# Patient Record
Sex: FEMALE | Race: WHITE | NOT HISPANIC OR LATINO | Employment: FULL TIME | URBAN - METROPOLITAN AREA
[De-identification: names, ages, dates, MRNs, and addresses within clinical notes are randomized per-mention and may not be internally consistent; named-entity substitution may affect disease eponyms.]

---

## 2017-01-06 ENCOUNTER — GENERIC CONVERSION - ENCOUNTER (OUTPATIENT)
Dept: OTHER | Facility: OTHER | Age: 37
End: 2017-01-06

## 2017-02-02 PROCEDURE — G0145 SCR C/V CYTO,THINLAYER,RESCR: HCPCS | Performed by: OBSTETRICS & GYNECOLOGY

## 2017-02-03 ENCOUNTER — LAB REQUISITION (OUTPATIENT)
Dept: LAB | Facility: HOSPITAL | Age: 37
End: 2017-02-03
Payer: COMMERCIAL

## 2017-02-03 DIAGNOSIS — Z11.51 ENCOUNTER FOR SCREENING FOR HUMAN PAPILLOMAVIRUS (HPV): ICD-10-CM

## 2017-02-03 DIAGNOSIS — Z01.419 ENCOUNTER FOR GYNECOLOGICAL EXAMINATION WITHOUT ABNORMAL FINDING: ICD-10-CM

## 2017-02-08 LAB
LAB AP GYN PRIMARY INTERPRETATION: NORMAL
LAB AP LMP: NORMAL
Lab: NORMAL

## 2017-05-12 ENCOUNTER — GENERIC CONVERSION - ENCOUNTER (OUTPATIENT)
Dept: OTHER | Facility: OTHER | Age: 37
End: 2017-05-12

## 2017-05-24 ENCOUNTER — ALLSCRIPTS OFFICE VISIT (OUTPATIENT)
Dept: OTHER | Facility: OTHER | Age: 37
End: 2017-05-24

## 2017-06-05 ENCOUNTER — GENERIC CONVERSION - ENCOUNTER (OUTPATIENT)
Dept: OTHER | Facility: OTHER | Age: 37
End: 2017-06-05

## 2017-09-18 ENCOUNTER — ALLSCRIPTS OFFICE VISIT (OUTPATIENT)
Dept: OTHER | Facility: OTHER | Age: 37
End: 2017-09-18

## 2017-12-06 ENCOUNTER — GENERIC CONVERSION - ENCOUNTER (OUTPATIENT)
Dept: FAMILY MEDICINE CLINIC | Facility: CLINIC | Age: 37
End: 2017-12-06

## 2017-12-13 ENCOUNTER — ALLSCRIPTS OFFICE VISIT (OUTPATIENT)
Dept: OTHER | Facility: OTHER | Age: 37
End: 2017-12-13

## 2017-12-14 NOTE — PROGRESS NOTES
Assessment    1  Encounter to establish care with new doctor (V65 8) (Z76 89)   2  Weight loss (783 21) (R63 4)   3  CAP (community acquired pneumonia) (5) (J18 9)   4  Controlled diabetes mellitus type 1 without complications (955 17) (M55 9)    Plan  Controlled diabetes mellitus type 1 without complications    · From  Levemir FlexTouch 100 UNIT/ML Subcutaneous Solution Pen-[id number] units sq at 9:00pm To Levemir FlexTouch 100 UNIT/ML Subcutaneous SolutionPen-injector USE AS DIRECTED    Discussion/Summary  Discussion Summary:   37F new patient here to establish care:Health Maintenance- UTD with metabolic screening that she gets done with Endo, will request recordssmear UTD as of this year, will need repeat in 2020 and do HPV at that timeType 1 DM- f/w [facility], will request records, recent A1c was 8 0%, UTD on optometrist and foot exams per ptLevemir 31 u qam and 36u qpm with Novolog SSI managed by Endo  Weight loss- pt has had a 7 pound weight loss in the past 3 months, extensive ROS revealed no acute symptoms to indicate a source and reportedly has had normal labs recently  Pt states that her pre-pregnancy weight was around 125, documented lowest prior to pregnancy in 2015 was 119  Was 138 in Sept 2017, today is 131  Likely residual pregnancy weight, but pt is anxiousto monitorlabwork if Endo labs were abnormalto review weight in 6 months, sooner PRN  PNA- recently diagnosed with PNA at urgent care and treated with Z-adriano, lungs clear today, no need for repeat CXRincentive breathing  Counseling Documentation With Imm: The patient was counseled regarding instructions for management,-- patient and family education  Medication SE Review and Pt Understands Tx: Possible side effects of new medications were reviewed with the patient/guardian today  The treatment plan was reviewed with the patient/guardian   The patient/guardian understands and agrees with the treatment plan      Chief Complaint  Chief Complaint Free Text Note Form: new pt      History of Present Illness  HPI: The patient is a new patient to the practice  PCP: Dr Arias James like the care at her last visit and has been looking to switch because her [facility] Dr Maggy Kingus 1 DMBTL - Nov 2015, [place] tooth removalHx: 2 C-sections at 39 weeks, one 3 5 and the other 3year oldLevemir 31 u qam, 36u qpmNKDAHx: No h/o DMII, cousins- Crohns, Mother- meningiomaHx:technician with [employer], cigarettes, illicit drugand sexually active  Pap was in Feb 2017- not due until 2020  complaints today:seen at [facility] less than a week ago got a Z-adriano and inhaler and had CXR that diagnosed PNA and required antibioticslabs recently with Endo: Last A1c was 8 0%infrequent HA with ear fullness and worried about brain tumor with the family history      Review of Systems  Complete-Female:  Constitutional: recent 7 pounds in the past 3 months lb weight loss, but-- no fever-- and-- no chills  ENT: no sore throat-- and-- no nasal discharge  Cardiovascular: no chest pain-- and-- no palpitations  Respiratory: no shortness of breath-- and-- no cough  Gastrointestinal: no nausea,-- no vomiting-- and-- no diarrhea  Active Problems    1  Controlled diabetes mellitus type 1 without complications (672 17) (P84 7)   2  Headache, unspecified headache type (784 0) (R51)   3  Neck pain on right side (723 1) (M54 2)   4  History of Need for vaccination for DTP (V06 1) (Z23)   5  Pressure in head (784 0) (R51)   6  Right ear pain (388 70) (H92 01)   7  Uterine Neoplasm (Obstetric) - Antepartum Condition Or Complication (905 78)    Past Medical History  1  History of Encounter for nuchal translucency testing (V28 89) (Z36 82)   2  History of anorexia nervosa (V11 8) (Z86 59)   3  History of pregnancy (V13 29)   4  History of Need for vaccination for DTP (V06 1) (Z23)   5  History of Pain In Flank (789 09)   6   History of Submucous leiomyoma of uterus (218 0) (D25 0)  Active Problems And Past Medical History Reviewed: The active problems and past medical history were reviewed and updated today  Surgical History  1  History of  Section   2  Denied: History Of Prior Surgery   3  History of Surgically Induced   Surgical History Reviewed: The surgical history was reviewed and updated today  Family History  Mother    1  Family history of Acute Hepatitis, B Virus   2  Family history of Meningioma  Father    3  Family history of Hypertension (V17 49)  Maternal Grandmother    4  Family history of Breast Cancer (V16 3)  Family History Reviewed: The family history was reviewed and updated today  Social History     · Being A Social Drinker   · History of Current Every Day Smoker (305 1)   · Denied: History of Drug Use   ·    · Never a smoker  Social History Reviewed: The social history was reviewed and updated today  Current Meds   1  Levemir FlexTouch 100 UNIT/ML Subcutaneous Solution Pen-injector; 10 units sq at 9:00pm; Therapy: 40PCM5551 to (Last Rx:87Ujp8192)  Requested for: 12UED7393 Ordered   2  NovoLOG 100 UNIT/ML Subcutaneous Solution; Therapy: (Recorded:49Xng6212) to Recorded    Allergies  1  No Known Drug Allergies  2  No Known Environmental Allergies   3  No Known Food Allergies    Vitals  Vital Signs    Recorded: 96Rvo8510 10:04AM   Temperature 96 8 F   Heart Rate 72   Respiration 16   Systolic 671   Diastolic 70   Height 5 ft 4 in   Weight 131 lb    BMI Calculated 22 49   BSA Calculated 1 63       Physical Exam   Constitutional  General appearance: No acute distress, well appearing and well nourished  Eyes  Conjunctiva and lids: No swelling, erythema or discharge  Pulmonary  Respiratory effort: No increased work of breathing or signs of respiratory distress  Auscultation of lungs: Clear to auscultation     Cardiovascular  Auscultation of heart: Normal rate and rhythm, normal S1 and S2, without murmurs  Skin  Skin and subcutaneous tissue: Normal without rashes or lesions  -- multiple tattoos on upper extremities          Signatures   Electronically signed by : Elfreda Duverney, M D ; Dec 13 2017 10:40AM EST                       (Author)

## 2018-01-11 NOTE — MISCELLANEOUS
Message  Return to work or school:        Our records indicate that you are overdue for a foot evaluation  Please contact your foot doctor to schedule an appointment  If you have any questions please contact our office at [phone]  Thank you     Jason Williamson Do/am       Signatures   Electronically signed by : Emily Henriquez, ; Sep  2 2016 12:03PM EST                       (Author)    Electronically signed by : Emily Henriquez, ; Sep  9 2016  8:45AM EST                       (Author)

## 2018-01-13 NOTE — PROGRESS NOTES
Assessment    1  Encounter for preventive health examination (V70 0) (Z00 00)   2  Headache, unspecified headache type (784 0) (R51)    Discussion/Summary  health maintenance visit      Chief Complaint  Yearly PE, has been getting headaches      History of Present Illness  HM, Adult Female: The patient is being seen for a health maintenance evaluation  General Health:   Screening:   Headache (Brief): The patient is being seen for an initial evaluation of and since April headaches  This condition is not related to a specific injury  Symptoms:  photophobia, but no nausea and no vomiting    The patient presents with complaints of gradual onset of intermittent episodes of mild alternating sides, bilateral frontal, bilateral temporal and bilateral occipital headache, described as dull  Episodes started about 5 months ago  She is currently experiencing headache  Her symptoms are caused by no known event  Symptoms are improved by NSAIDs  Symptoms are unchanged  The patient is currently experiencing symptoms  Pain scores include a minimum pain level of 2/10 and a maximum pain level of 6/10  Functional Limitations: general activity  Current treatment includes nonsteroidal anti-inflammatory drugs  By report there is fair symptom control  Active Problems    1  Acute sinusitis (461 9) (J01 90)   2  Cervical cancer screening (V76 2) (Z12 4)   3  Controlled diabetes mellitus type 1 without complications (416 21) (R40 2)   4  Encounter for fetal anatomic survey (V28 81) (Z36)   5  Need for influenza vaccination (V04 81) (Z23)   6  Need for vaccination for DTP (V06 1) (Z23)   7  Right ear pain (388 70) (H92 01)   8  Screening for depression (V79 0) (Z13 89)   9  Screening, , for risk of pre-term labor (V28 82) (Z36)   10  URTI (acute upper respiratory infection) (465 9) (J06 9)   11   Uterine Neoplasm (Obstetric) - Antepartum Condition Or Complication (062 19)    Past Medical History    · History of Encounter for nuchal translucency testing (V28 89) (Z36)   · History of anorexia nervosa (V11 8) (Z86 59)   · History of pregnancy (V13 29)   · Need for vaccination for DTP (V06 1) (Z23)   · History of Pain In Flank (789 09)   · History of Submucous leiomyoma of uterus (218 0) (D25 0)    Surgical History    · History of  Section   · Denied: History Of Prior Surgery   · History of Surgically Induced     Family History  Mother    · Family history of Acute Hepatitis, B Virus   · Family history of Meningioma  Father    · Family history of Hypertension (V17 49)  Maternal Grandmother    · Family history of Breast Cancer (V16 3)    Social History    · Being A Social Drinker   · History of Current Every Day Smoker (305 1)   · Denied: History of Drug Use   ·    · Never a smoker    Current Meds   1  Levemir FlexTouch 100 UNIT/ML Subcutaneous Solution Pen-injector; 10 units sq at   9:00pm;   Therapy: 33NCI2437 to (Last Rx:46Ktb5421)  Requested for: 44TCE6610 Ordered   2  NovoLOG 100 UNIT/ML Subcutaneous Solution; Therapy: (Recorded:2017) to Recorded    Allergies    1  No Known Drug Allergies    2  No Known Environmental Allergies   3  No Known Food Allergies    Vitals   Recorded: 2017 01:34PM Recorded: 89FMZ7949 35:61KC   Systolic 515    Diastolic 78    Height  5 ft 4 in   Weight  138 lb 2 oz   BMI Calculated  23 71   BSA Calculated  1 67     Results/Data  PHQ-2 Adult Depression Screening 2017 01:07PM User, Ahs     Test Name Result Flag Reference   PHQ-2 Adult Depression Score 0     Over the last two weeks, how often have you been bothered by any of the following problems? Little interest or pleasure in doing things: Not at all - 0  Feeling down, depressed, or hopeless: Not at all - 0   PHQ-2 Adult Depression Screening Negative         Health Management  Cervical cancer screening   (1) THIN PREP PAP WITH IMAGING; every 3 years; Last ; Next Due:  2020;  Active  Diabetes mellitus type 2, uncontrolled, without complications   (1) HEMOGLOBIN A1C; every 6 months; Last 02JSC1839; Next Due: 72AUI7399; Overdue  (1) MICROALBUMIN CREATININE RATIO, RANDOM URINE; every 1 year; Next Due:  17Tle6928; Overdue  *VB - Foot Exam; every 1 year; Last 19Fub6132; Next Due: 96Hln1079; Overdue  Screening for diabetic retinopathy   *VB - Eye Exam; every 1 year; Last 35BZO5723; Next Due: 48VUO3534;  Active    Signatures   Electronically signed by : Tiffanie Yates DO; Sep 19 5914 12:07PM EST                       (Author)

## 2018-01-13 NOTE — PROGRESS NOTES
Assessment    1  Controlled diabetes mellitus type 1 without complications (617 61) (V89 3)   2  Encounter for preventive health examination (V70 0) (Z00 00)    Plan  Controlled diabetes mellitus type 1 without complications    · From  NovoLOG SOLN  To NovoLOG 100 UNIT/ML Subcutaneous Solution  USE AS DIRECTED   · 3 - Yuniel Toledo DO  (Internal Medicine) Physician Referral  Consult  Status: Hold For -  Scheduling  Requested for: 12Sep2016  : has been seen ther for > 2 years  Labs are forwarded to us  I do NOT do primary      DM management  are Referring to a non-SL Preferred Provider : Established Patient  Care Summary provided  : Yes   · *VB - Foot Exam; Status:Complete;   Done: 14JLS9883 11:17AM  Diabetes mellitus type 2, uncontrolled, without complications    · (1) HEMOGLOBIN A1C ; every 6 months; Last 08OWZ9155; Next 75KSQ6663;  Status:Active   · *VB - Foot Exam ; every 1 year;  Last 30Ate3018; Next 01Baa0995; [address] [place] Maintenance    · Begin a limited exercise program ; Status:Complete;   Done: 02ZOI6723   · Brush your teeth 3 times a day and floss at least once a day ; Status:Complete;   Done:  65EAI9302   · Decreasing the stress in your life may help your condition improve ; Status:Complete;    Done: 15MMJ9484   · Limit your use of alcohol to 2 drinks or cans of beer a day ; Status:Complete;   Done:  64BJT3790   · Use a sun block product with an SPF of 15 or more ; Status:Complete;   Done:  20PKY7812   · We recommend that you make the following changes in your diet to help prevent or  reduce symptoms of PMS ; Status:Complete;   Done: 41BGA4273   · We want you to follow the Therapeutic Lifestyle Changes (TLC) diet ; Status:Complete;    Done: 81DSC2516   · Follow Up in 2 Years Evaluation and Treatment  Follow-up  Status: Complete  Done:  61QAX0487  Screening for depression    · *VB-Depression Screening; Status:Complete - Retrospective By Protocol Authorization;    Done: 87BDK6661 10: 29AM    Discussion/Summary  health maintenance visit Currently, she eats a healthy diet and has an adequate exercise regimen  cervical cancer screening is current Breast cancer screening: breast cancer screening is not indicated  Colorectal cancer screening: colorectal cancer screening is not indicated  Osteoporosis screening: bone mineral density testing is not indicated  The patient declines immunizations  Advice and education were given regarding nutrition, aerobic exercise, calcium supplements, vitamin D supplements, reproductive health, sunscreen use and self skin examination  Patient discussion: discussed with the patient, 20 minute visit, greater than half of the time was spent on counseling  Chief Complaint  Pt is here for a yearly physical      History of Present Illness  HM, Adult Female: The patient is being seen for a health maintenance evaluation  Social History: Household members include spouse, 1 daughter(s) and 1 son(s)  She is   Work status: on maternity leave  The patient has never smoked cigarettes  She reports rare alcohol use  General Health: The patient's health since the last visit is described as good  She has regular dental visits  She complains of vision problems  Vision care includes an eye examination within the last year  Immunizations status: not up to date  Lifestyle:  She consumes a diverse and healthy diet  She is on a carb counting diet and is generally adherent  She exercises regularly  She exercises less than three times a week  Exercise includes walking  Reproductive health:  she reports abnormal menses  Menstrual history:  age at menarche was   The cycles have been regular  she uses contraception  For contraception, she has had a tubal occlusion  she is sexually active  pregnancy history: G 2  Screening:      Review of Systems    Constitutional: no fever and no chills  Eyes: no eye pain  ENT: no hearing loss  Cardiovascular: no chest pain  Respiratory: No complaints of shortness of breath, no wheezing, no cough, no SOB on exertion, no orthopnea, no PND  Gastrointestinal: no constipation  Genitourinary: no dysuria  Musculoskeletal: no arthralgias  Integumentary: no rashes  Psychiatric: Not suicidal, no sleep disturbance, no anxiety or depression, no change in personality, no emotional problems  Endocrine: No complaints of proptosis, no hot flashes, no muscle weakness, no deepening of the voice, no feelings of weakness  Hematologic/Lymphatic: No complaints of swollen glands, no swollen glands in the neck, does not bleed easily, does not bruise easily  ROS reviewed  Active Problems    1  Acute sinusitis (461 9) (J01 90)   2  Cervical cancer screening (V76 2) (Z12 4)   3  Encounter for fetal anatomic survey (V28 81) (Z36)   4  Need for influenza vaccination (V04 81) (Z23)   5  Need for vaccination for DTP (V06 1) (Z23)   6  Screening, , for risk of pre-term labor (V28 82) (Z36)   7  URTI (acute upper respiratory infection) (465 9) (J06 9)   8   Uterine Neoplasm (Obstetric) - Antepartum Condition Or Complication (370 42)    Past Medical History    · History of Encounter for nuchal translucency testing (V28 89) (Z36)   · History of anorexia nervosa (V11 8) (Z86 59)   · History of pregnancy (V13 29)   · Need for vaccination for DTP (V06 1) (Z23)   · History of Pain In Flank (789 09)   · History of Submucous leiomyoma of uterus (218 0) (D25 0)    Surgical History    · History of  Section   · Denied: History Of Prior Surgery   · History of Surgically Induced     Family History  Mother    · Family history of Acute Hepatitis, B Virus   · Family history of Meningioma  Father    · Family history of Hypertension (V17 49)  Maternal Grandmother    · Family history of Breast Cancer (V16 3)    Social History    · Being A Social Drinker   · History of Current Every Day Smoker (305 1)   · Denied: History of Drug Use   ·    · Never a smoker    Current Meds   1  Levemir FlexTouch 100 UNIT/ML Subcutaneous Solution Pen-injector; 10 units sq at   9:00pm;   Therapy: 05JVE7492 to (Last Rx:13Jan2015)  Requested for: 84XCR9834 Ordered   2  NovoLOG SOLN;   Therapy: (Recorded:12Sep2016) to Recorded    Allergies    1  No Known Drug Allergies    2  No Known Environmental Allergies   3  No Known Food Allergies    Vitals   Recorded: 12Sep2016 11:01AM Recorded: 61MND6713 55:15SX   Systolic 485    Diastolic 80    Height  5 ft 5 in   Weight  146 lb 2 08 oz   BMI Calculated  24 32   BSA Calculated  1 73     Physical Exam    Constitutional   General appearance: No acute distress, well appearing and well nourished  Eyes   Pupils and irises: Equal, round, reactive to light  Ears, Nose, Mouth, and Throat   Otoscopic examination: Tympanic membranes translucent with normal light reflex  Canals patent without erythema  Nasal mucosa, septum, and turbinates: Normal without edema or erythema  Lips, teeth, and gums: Normal, good dentition  Oropharynx: Normal with no erythema, edema, exudate or lesions  Neck   Thyroid: Normal, no thyromegaly  Pulmonary   Auscultation of lungs: Clear to auscultation  Cardiovascular   Auscultation of heart: Normal rate and rhythm, normal S1 and S2, no murmurs  Carotid pulses: 2+ bilaterally  Peripheral vascular exam: Normal     Abdomen   Abdomen: Non-tender, no masses  Liver and spleen: No hepatomegaly or splenomegaly  Lymphatic   Palpation of lymph nodes in other areas: No lymphadenopathy  Musculoskeletal   Gait and station: Normal     Range of motion: Normal     Muscle strength/tone: Normal     Skin   Skin and subcutaneous tissue: Normal without rashes or lesions  Neurologic   Sensation: No sensory loss  Psychiatric   Orientation to person, place, and time: Normal     Mood and affect: Normal     Right Foot Findings: normal foot   The toes were normal  Left Foot Findings: normal foot  The toes were normal    Monofilament Testing: normal tactile sensation with monofilament testing throughout both feet  Vascular: Pulses: 2+ in the posterior tibialis  Pulses: 2+ in the posterior tibialis  Assign Risk Category: 0: No loss of protective sensation, no deformity  No present risk  Results/Data  Prime MD Depression Screening 12Sep2016 10:29AM User, Johnson     Test Name Result Flag Reference   PRIME-MD Depression Screening 0/9 - Likely not MD     Depressed mood: No  Loss of interest: No     (1) HEMOGLOBIN A1C 00GAY5624 95:59QX Minus Salter     Test Name Result Flag Reference   HEMOGLOBIN A1C 7 2       Summary / No summary entered :      No summary entered  Documents attached :      [address] Work - Minus Salter; Enc: [id number] - Image Encounter - Minus Salter -      (Family Medicine) (Additional Information Document)    Health Management  Cervical cancer screening   (1) THIN PREP PAP WITH IMAGING; every 3 years; Last 75Cms1341; Next Due:  34Uhd1686; Active  Diabetes mellitus type 2, uncontrolled, without complications   (1) HEMOGLOBIN A1C; every 6 months; Last 91HQS1154; Next Due: 22NEU7835; Active  (1) MICROALBUMIN CREATININE RATIO, RANDOM URINE; every 1 year; Next Due:  90Boz9875; Overdue  *VB - Foot Exam; every 1 year; Last 63Ouk0925; Next Due: 82Rsj3662; Active  Screening for diabetic retinopathy   *VB - Eye Exam; every 1 year; Last 84FGD2013;  Next Due: 97CGG2168; Near Due    Signatures   Electronically signed by : Lior Ayers DO; Sep 12 2224 11:22AM EST                       (Author)

## 2018-01-15 VITALS
BODY MASS INDEX: 23.58 KG/M2 | WEIGHT: 138.13 LBS | TEMPERATURE: 98.9 F | HEIGHT: 64 IN | DIASTOLIC BLOOD PRESSURE: 78 MMHG | SYSTOLIC BLOOD PRESSURE: 118 MMHG

## 2018-01-17 ENCOUNTER — GENERIC CONVERSION - ENCOUNTER (OUTPATIENT)
Dept: OTHER | Facility: OTHER | Age: 38
End: 2018-01-17

## 2018-01-17 DIAGNOSIS — M94.0 CHONDROCOSTAL JUNCTION SYNDROME: ICD-10-CM

## 2018-01-22 VITALS
WEIGHT: 138.13 LBS | SYSTOLIC BLOOD PRESSURE: 118 MMHG | BODY MASS INDEX: 23.58 KG/M2 | DIASTOLIC BLOOD PRESSURE: 78 MMHG | HEIGHT: 64 IN

## 2018-01-23 VITALS
HEIGHT: 64 IN | BODY MASS INDEX: 22.36 KG/M2 | WEIGHT: 131 LBS | HEART RATE: 72 BPM | DIASTOLIC BLOOD PRESSURE: 70 MMHG | RESPIRATION RATE: 16 BRPM | SYSTOLIC BLOOD PRESSURE: 124 MMHG | TEMPERATURE: 96.8 F

## 2018-01-24 VITALS
DIASTOLIC BLOOD PRESSURE: 62 MMHG | TEMPERATURE: 96.8 F | SYSTOLIC BLOOD PRESSURE: 110 MMHG | WEIGHT: 133.4 LBS | RESPIRATION RATE: 16 BRPM | BODY MASS INDEX: 22.77 KG/M2 | HEIGHT: 64 IN | HEART RATE: 76 BPM

## 2018-02-05 ENCOUNTER — LAB REQUISITION (OUTPATIENT)
Dept: LAB | Facility: HOSPITAL | Age: 38
End: 2018-02-05
Payer: COMMERCIAL

## 2018-02-05 DIAGNOSIS — Z11.51 ENCOUNTER FOR SCREENING FOR HUMAN PAPILLOMAVIRUS (HPV): ICD-10-CM

## 2018-02-05 DIAGNOSIS — Z01.419 ENCOUNTER FOR GYNECOLOGICAL EXAMINATION WITHOUT ABNORMAL FINDING: ICD-10-CM

## 2018-02-05 PROCEDURE — 87624 HPV HI-RISK TYP POOLED RSLT: CPT | Performed by: OBSTETRICS & GYNECOLOGY

## 2018-02-05 PROCEDURE — G0145 SCR C/V CYTO,THINLAYER,RESCR: HCPCS | Performed by: OBSTETRICS & GYNECOLOGY

## 2018-02-08 LAB — HPV RRNA GENITAL QL NAA+PROBE: NORMAL

## 2018-02-09 LAB
LAB AP GYN PRIMARY INTERPRETATION: NORMAL
LAB AP LMP: NORMAL
Lab: NORMAL

## 2018-02-21 ENCOUNTER — TRANSCRIBE ORDERS (OUTPATIENT)
Dept: ADMINISTRATIVE | Facility: HOSPITAL | Age: 38
End: 2018-02-21

## 2018-02-21 ENCOUNTER — HOSPITAL ENCOUNTER (OUTPATIENT)
Dept: RADIOLOGY | Facility: HOSPITAL | Age: 38
Discharge: HOME | End: 2018-02-21
Payer: COMMERCIAL

## 2018-02-21 DIAGNOSIS — M94.0 CHONDROCOSTAL JUNCTION SYNDROME: ICD-10-CM

## 2018-02-21 PROCEDURE — 71101 X-RAY EXAM UNILAT RIBS/CHEST: CPT

## 2018-02-26 ENCOUNTER — TELEPHONE (OUTPATIENT)
Dept: FAMILY MEDICINE CLINIC | Facility: CLINIC | Age: 38
End: 2018-02-26

## 2018-02-26 NOTE — TELEPHONE ENCOUNTER
X-ray shows a subacute fracture  Of her left 6th rib, this is likely causing her pain and that in January  Does not look like she has a new fracture at this time

## 2018-03-22 PROBLEM — H52.13 MYOPIA OF BOTH EYES WITH ASTIGMATISM: Status: ACTIVE | Noted: 2018-03-22

## 2018-03-22 PROBLEM — H52.203 MYOPIA OF BOTH EYES WITH ASTIGMATISM: Status: ACTIVE | Noted: 2018-03-22

## 2018-03-22 LAB
LEFT EYE DIABETIC RETINOPATHY: NORMAL
RIGHT EYE DIABETIC RETINOPATHY: NORMAL

## 2018-03-22 PROCEDURE — 3072F LOW RISK FOR RETINOPATHY: CPT | Performed by: FAMILY MEDICINE

## 2018-06-19 RX ORDER — LANCETS
EACH MISCELLANEOUS
COMMUNITY
Start: 2017-12-06

## 2018-06-20 ENCOUNTER — OFFICE VISIT (OUTPATIENT)
Dept: FAMILY MEDICINE CLINIC | Facility: CLINIC | Age: 38
End: 2018-06-20
Payer: COMMERCIAL

## 2018-06-20 VITALS
DIASTOLIC BLOOD PRESSURE: 68 MMHG | RESPIRATION RATE: 16 BRPM | TEMPERATURE: 97.3 F | HEART RATE: 84 BPM | SYSTOLIC BLOOD PRESSURE: 100 MMHG | BODY MASS INDEX: 22.52 KG/M2 | WEIGHT: 131.2 LBS

## 2018-06-20 DIAGNOSIS — R63.4 WEIGHT LOSS: ICD-10-CM

## 2018-06-20 DIAGNOSIS — E10.9 TYPE 1 DIABETES MELLITUS WITHOUT COMPLICATION (HCC): ICD-10-CM

## 2018-06-20 DIAGNOSIS — J02.9 SORE THROAT: ICD-10-CM

## 2018-06-20 DIAGNOSIS — G43.829 MENSTRUAL MIGRAINE WITHOUT STATUS MIGRAINOSUS, NOT INTRACTABLE: Primary | ICD-10-CM

## 2018-06-20 PROCEDURE — 99214 OFFICE O/P EST MOD 30 MIN: CPT | Performed by: FAMILY MEDICINE

## 2018-06-20 PROCEDURE — 1036F TOBACCO NON-USER: CPT | Performed by: FAMILY MEDICINE

## 2018-06-20 RX ORDER — PEN NEEDLE, DIABETIC 32GX 5/32"
NEEDLE, DISPOSABLE MISCELLANEOUS
COMMUNITY
Start: 2018-06-14

## 2018-06-20 NOTE — PROGRESS NOTES
FAMILY MEDICINE PROGRESS NOTE  Radha Hanna 41 y o  female   DATE: June 20, 2018     ASSESSMENT and PLAN:  Radha Hanna is a 41 y o  female with:     Weight loss  Seen 6 months ago with concern for weight loss, but stable weight, 131lbs today which is the exact same as 6 months ago  -Reassurance that likely related to post-partum weight loss  -No labs needed    Type 1 diabetes mellitus without complication (HCC)  Lab Results   Component Value Date    HGBA1C 9 3 (H) 11/06/2014       Uncontrolled, follows with [facility], pt is afraid of hypoglycemic episodes, ok with pens, doesn't want to do insulin pump  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  Normal Diabetic foot exam today  -Mgmt per Endo      Menstrual migraine without status migrainosus, not intractable   Patient brought a headache diary with her, states that she gets headaches approximately 3 to 4 times a month, seem to be related to her menstrual cycle  Does have improvement with low-dose ibuprofen  -Try starting low-dose ibuprofen once a day 2 days prior to menses to see if that helps reduce severity and or frequency    Sore throat   Mild inflammation of the left tonsil on exam, given that symptoms are improving and her age likely is a viral tonsillitis  Given that she may have a bit of a canker sore or inflammation recommended saltwater gargles, if persists would do viscous lidocaine     return to clinic in 1 year for annual physical    SUBJECTIVE:  Radha Hanna is a 41 y o  female who presents today with a chief complaint of Follow-up (6 month)  6 month f/u for weight    1  Weight loss- had lost weight since the pregnancy and weight 131 lb 6 months ago  2  Acute rib fracture has resolved  3  DM type 1- doesn't want to do an insulin pump, is on   -Gets yearly eye [facility] [address]   -No Podiatrist  4   Her kids had a sore throat a couple weeks ago, she has had a sore throat for the last week, but has been, just have it looked at     Has BTL, not on any birth control      Review of Systems   Constitutional: Negative for chills and fever  Eyes: Negative for visual disturbance  Respiratory: Negative for cough and shortness of breath  Cardiovascular: Negative for chest pain and palpitations  Neurological: Positive for headaches  Negative for dizziness  I have reviewed the patient's PMH, Social History, Medication List and Allergies as appropriate  OBJECTIVE:  /68 (BP Location: Left arm, Patient Position: Sitting, Cuff Size: Adult)   Pulse 84   Temp (!) 97 3 °F (36 3 °C) (Tympanic)   Resp 16   Wt 59 5 kg (131 lb 3 2 oz)   BMI 22 52 kg/m²    Physical Exam   Constitutional: She appears well-developed and well-nourished  No distress  HENT:   Mouth/Throat:       Cardiovascular: Normal rate, regular rhythm and normal heart sounds  Pulmonary/Chest: Effort normal and breath sounds normal  No respiratory distress  She has no wheezes  She has no rales  Feet:   Right Foot:   Skin Integrity: Negative for ulcer, skin breakdown, erythema, warmth, callus or dry skin  Left Foot:   Skin Integrity: Negative for ulcer, skin breakdown, erythema, warmth, callus or dry skin  Skin: She is not diaphoretic  Vitals reviewed  Patient's shoes and socks removed  Right Foot/Ankle   Right Foot Inspection  Skin Exam: skin normal and skin intact no dry skin, no warmth, no callus, no erythema, no maceration, no abnormal color, no pre-ulcer, no ulcer and no callus                          Toe Exam: ROM and strength within normal limits        Left Foot/Ankle  Left Foot Inspection  Skin Exam: skin normal and skin intactno dry skin, no warmth, no erythema, no maceration, normal color, no pre-ulcer, no ulcer and no callus                         Toe Exam: ROM and strength within normal limits                       Assign Risk Category:  No deformity present; ;        Risk: 0      Antoinette Doe MD

## 2018-06-20 NOTE — ASSESSMENT & PLAN NOTE
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

## 2018-06-20 NOTE — ASSESSMENT & PLAN NOTE
Mild inflammation of the left tonsil on exam, given that symptoms are improving and her age likely is a viral tonsillitis      Given that she may have a bit of a canker sore or inflammation recommended saltwater gargles, if persists would do viscous lidocaine

## 2018-06-20 NOTE — ASSESSMENT & PLAN NOTE
Patient brought a headache diary with her, states that she gets headaches approximately 3 to 4 times a month, seem to be related to her menstrual cycle  Does have improvement with low-dose ibuprofen      -Try starting low-dose ibuprofen once a day 2 days prior to menses to see if that helps reduce severity and or frequency

## 2018-06-20 NOTE — ASSESSMENT & PLAN NOTE
Seen 6 months ago with concern for weight loss, but stable weight, 131lbs today which is the exact same as 6 months ago  -Reassurance that likely related to post-partum weight loss  -No labs needed

## 2019-03-25 ENCOUNTER — ANNUAL EXAM (OUTPATIENT)
Dept: OBGYN CLINIC | Facility: CLINIC | Age: 39
End: 2019-03-25
Payer: COMMERCIAL

## 2019-03-25 VITALS
WEIGHT: 139.5 LBS | DIASTOLIC BLOOD PRESSURE: 72 MMHG | BODY MASS INDEX: 24.72 KG/M2 | SYSTOLIC BLOOD PRESSURE: 112 MMHG | HEIGHT: 63 IN

## 2019-03-25 DIAGNOSIS — E10.9 TYPE 1 DIABETES MELLITUS WITHOUT COMPLICATION (HCC): ICD-10-CM

## 2019-03-25 DIAGNOSIS — Z01.419 ENCOUNTER FOR WELL WOMAN EXAM: Primary | ICD-10-CM

## 2019-03-25 PROCEDURE — S0610 ANNUAL GYNECOLOGICAL EXAMINA: HCPCS | Performed by: PHYSICIAN ASSISTANT

## 2019-03-25 NOTE — PROGRESS NOTES
Pt is here for yearly exam      pap normal HPV neg 2/5/18,   pap normal 2/2/17,   pap normal 12/21/15

## 2019-03-25 NOTE — PROGRESS NOTES
Assessment/Plan:    No problem-specific Assessment & Plan notes found for this encounter  Diagnoses and all orders for this visit:    Encounter for well woman exam    Type 1 diabetes mellitus without complication ([facility] [address] )    Other orders  -     Insulin Degludec (TRESIBA) 100 UNIT/ML SOLN; Inject under the skin          Subjective:      Patient ID: Pérez Sarmiento is a 41 y o  female  Pt presents for her yearly exam--she has no complaints  Periods are regular  Bowel and bladder ok  No breast concerns  Pt had TL  Her DM is stable--       The following portions of the patient's history were reviewed and updated as appropriate: allergies, current medications, past family history, past medical history, past social history, past surgical history and problem list     Review of Systems   Constitutional: Negative for chills, fever and unexpected weight change  Gastrointestinal: Negative for abdominal pain, blood in stool, constipation and diarrhea  Genitourinary: Negative  Objective:      /72 (BP Location: Right arm, Patient Position: Sitting, Cuff Size: Standard)   Ht 5' 3" (1 6 m)   Wt 63 3 kg (139 lb 8 oz)   LMP 03/09/2019   Breastfeeding? No   BMI 24 71 kg/m²          Physical Exam   Constitutional: She appears well-developed and well-nourished  HENT:   Head: Normocephalic and atraumatic  Neck: Normal range of motion  Pulmonary/Chest: Right breast exhibits no inverted nipple, no mass, no nipple discharge and no skin change  Left breast exhibits no inverted nipple, no mass, no nipple discharge and no skin change  Abdominal: Soft  Genitourinary: Vagina normal and uterus normal  Pelvic exam was performed with patient supine  There is no rash, tenderness or lesion on the right labia  There is no rash, tenderness or lesion on the left labia  Cervix exhibits no motion tenderness, no discharge and no friability  Right adnexum displays no mass, no tenderness and no fullness   Left adnexum displays no mass, no tenderness and no fullness  Lymphadenopathy: No inguinal adenopathy noted on the right or left side  Nursing note and vitals reviewed

## 2019-04-11 ENCOUNTER — TELEPHONE (OUTPATIENT)
Dept: FAMILY MEDICINE CLINIC | Facility: CLINIC | Age: 39
End: 2019-04-11

## 2019-04-11 DIAGNOSIS — E10.9 TYPE 1 DIABETES MELLITUS WITHOUT COMPLICATION (HCC): Primary | ICD-10-CM

## 2019-04-15 LAB
CREAT ?TM UR-SCNC: 36 UMOL/L
HBA1C MFR BLD HPLC: 9.6 %

## 2019-04-16 LAB
BASOPHILS # BLD AUTO: 50 CELLS/UL (ref 0–200)
BASOPHILS NFR BLD AUTO: 0.8 %
EOSINOPHIL # BLD AUTO: 50 CELLS/UL (ref 15–500)
EOSINOPHIL NFR BLD AUTO: 0.8 %
ERYTHROCYTE [DISTWIDTH] IN BLOOD BY AUTOMATED COUNT: 11.9 % (ref 11–15)
HCT VFR BLD AUTO: 37.6 % (ref 35–45)
HGB BLD-MCNC: 12.4 G/DL (ref 11.7–15.5)
LYMPHOCYTES # BLD AUTO: 1953 CELLS/UL (ref 850–3900)
LYMPHOCYTES NFR BLD AUTO: 31 %
MCH RBC QN AUTO: 29.8 PG (ref 27–33)
MCHC RBC AUTO-ENTMCNC: 33 G/DL (ref 32–36)
MCV RBC AUTO: 90.4 FL (ref 80–100)
MONOCYTES # BLD AUTO: 340 CELLS/UL (ref 200–950)
MONOCYTES NFR BLD AUTO: 5.4 %
NEUTROPHILS # BLD AUTO: 3906 CELLS/UL (ref 1500–7800)
NEUTROPHILS NFR BLD AUTO: 62 %
PLATELET # BLD AUTO: 218 THOUSAND/UL (ref 140–400)
PMV BLD REES-ECKER: 14.8 FL (ref 7.5–12.5)
RBC # BLD AUTO: 4.16 MILLION/UL (ref 3.8–5.1)
WBC # BLD AUTO: 6.3 THOUSAND/UL (ref 3.8–10.8)

## 2019-04-22 ENCOUNTER — OFFICE VISIT (OUTPATIENT)
Dept: FAMILY MEDICINE CLINIC | Facility: CLINIC | Age: 39
End: 2019-04-22
Payer: COMMERCIAL

## 2019-04-22 VITALS
SYSTOLIC BLOOD PRESSURE: 102 MMHG | RESPIRATION RATE: 16 BRPM | TEMPERATURE: 97.5 F | DIASTOLIC BLOOD PRESSURE: 64 MMHG | HEIGHT: 65 IN | OXYGEN SATURATION: 99 % | HEART RATE: 77 BPM | WEIGHT: 138 LBS | BODY MASS INDEX: 22.99 KG/M2

## 2019-04-22 DIAGNOSIS — Z13.220 SCREENING FOR HYPERLIPIDEMIA: ICD-10-CM

## 2019-04-22 DIAGNOSIS — Z23 NEED FOR PNEUMOCOCCAL VACCINATION: ICD-10-CM

## 2019-04-22 DIAGNOSIS — G43.829 MENSTRUAL MIGRAINE WITHOUT STATUS MIGRAINOSUS, NOT INTRACTABLE: Primary | ICD-10-CM

## 2019-04-22 DIAGNOSIS — Z00.00 ROUTINE ADULT HEALTH MAINTENANCE: ICD-10-CM

## 2019-04-22 PROCEDURE — 99395 PREV VISIT EST AGE 18-39: CPT | Performed by: FAMILY MEDICINE

## 2019-04-22 PROCEDURE — 90471 IMMUNIZATION ADMIN: CPT | Performed by: FAMILY MEDICINE

## 2019-04-22 PROCEDURE — 90732 PPSV23 VACC 2 YRS+ SUBQ/IM: CPT | Performed by: FAMILY MEDICINE

## 2019-04-22 PROCEDURE — 90472 IMMUNIZATION ADMIN EACH ADD: CPT | Performed by: FAMILY MEDICINE

## 2019-04-22 RX ORDER — SUMATRIPTAN 50 MG/1
50 TABLET, FILM COATED ORAL ONCE AS NEEDED
Qty: 9 TABLET | Refills: 0 | Status: SHIPPED | OUTPATIENT
Start: 2019-04-22 | End: 2022-05-24 | Stop reason: ALTCHOICE

## 2019-05-07 LAB
LEFT EYE DIABETIC RETINOPATHY: NORMAL
RIGHT EYE DIABETIC RETINOPATHY: NORMAL

## 2019-06-10 LAB
CHOLEST SERPL-MCNC: 182 MG/DL
CHOLEST/HDLC SERPL: 2.5 (CALC)
HDLC SERPL-MCNC: 74 MG/DL
LDLC SERPL CALC-MCNC: 94 MG/DL (CALC)
NONHDLC SERPL-MCNC: 108 MG/DL (CALC)
TRIGL SERPL-MCNC: 54 MG/DL

## 2019-09-25 LAB — HBA1C MFR BLD HPLC: 9.7 %

## 2019-10-18 ENCOUNTER — OFFICE VISIT (OUTPATIENT)
Dept: FAMILY MEDICINE CLINIC | Facility: CLINIC | Age: 39
End: 2019-10-18
Payer: COMMERCIAL

## 2019-10-18 VITALS
TEMPERATURE: 96.8 F | HEART RATE: 79 BPM | BODY MASS INDEX: 22.88 KG/M2 | OXYGEN SATURATION: 96 % | DIASTOLIC BLOOD PRESSURE: 60 MMHG | SYSTOLIC BLOOD PRESSURE: 116 MMHG | RESPIRATION RATE: 16 BRPM | WEIGHT: 137.5 LBS

## 2019-10-18 DIAGNOSIS — J40 BRONCHITIS: Primary | ICD-10-CM

## 2019-10-18 DIAGNOSIS — E10.9 TYPE 1 DIABETES MELLITUS WITHOUT COMPLICATION (HCC): ICD-10-CM

## 2019-10-18 PROCEDURE — 99214 OFFICE O/P EST MOD 30 MIN: CPT | Performed by: FAMILY MEDICINE

## 2019-10-18 RX ORDER — BENZONATATE 100 MG/1
100 CAPSULE ORAL 3 TIMES DAILY PRN
Qty: 30 CAPSULE | Refills: 0 | Status: SHIPPED | OUTPATIENT
Start: 2019-10-18 | End: 2019-10-28

## 2019-10-18 RX ORDER — ALBUTEROL SULFATE 90 UG/1
2 AEROSOL, METERED RESPIRATORY (INHALATION) EVERY 6 HOURS PRN
Qty: 1 INHALER | Refills: 5 | Status: SHIPPED | OUTPATIENT
Start: 2019-10-18 | End: 2020-04-27

## 2019-10-18 NOTE — PROGRESS NOTES
FAMILY MEDICINE PROGRESS NOTE  Aileen Cuevas 42 y o  female   DATE: 2019     ASSESSMENT and PLAN:  Aileen Cuevas is a 42 y o  female with:     1  Type 1 diabetes mellitus without complication (HCC)  Lab Results   Component Value Date    HGBA1C 9 6 04/15/2019   Pt will be getting a 24 hour glucometer through Endocrinology, but ran out of strips while waiting for the glucometer, so sent refill  - glucose blood (ACCU-CHEK MUKESH PLUS) test strip; 1 each by Other route as needed (hypoglycemia) Use as instructed  Dispense: 100 each; Refill: 0    2  Bronchitis  Likely mild acute viral bronchitis, no fever or focal findings, normal oxygen  Add Dulera sample BID x 1 week, albuterol PRN (old Rx has ), Tessalon PRN and supportive care as below  Would avoid PO steroid given hyperglycemia  -Mucinex DM 1200mg BID x 1 week  -NSAIDs or Tylenol PRN  -Reviewed supportive measures including intranasal saline, honey, salt water gargles, hot tea  Reinforced good hand hygiene   -Patient agreeable with the plan and expressed understanding  I discucssed signs and symptoms for which to RTC, go to ER or seek urgent medical care  -RTC PRN  - benzonatate (TESSALON PERLES) 100 mg capsule; Take 1 capsule (100 mg total) by mouth 3 (three) times a day as needed for cough for up to 10 days  Dispense: 30 capsule; Refill: 0  - albuterol (PROVENTIL HFA,VENTOLIN HFA) 90 mcg/act inhaler; Inhale 2 puffs every 6 (six) hours as needed for wheezing or shortness of breath  Dispense: 1 Inhaler; Refill: 5  - mometasone-formoterol (DULERA) 100-5 MCG/ACT inhaler; Inhale 2 puffs 2 (two) times a day Rinse mouth after use  Dispense: 1 Inhaler; Refill: 0    Patient agreeable with the plan and expressed understanding  I discucssed signs and symptoms for which to RTC, go to ER or seek urgent medical care       SUBJECTIVE:  Aileen Cuevas is a 42 y o  female who presents today with a chief complaint of Cough  Cough   This is a new problem  The current episode started in the past 7 days (3 days ago)  Progression since onset: 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  The cough is productive of sputum  Pertinent negatives include no chills, ear pain, fever, hemoptysis, nasal congestion, rhinorrhea, sore throat, shortness of breath or wheezing  She has tried OTC inhaler for the symptoms  Review of Systems   Constitutional: Negative for chills and fever  HENT: Negative for congestion, ear pain, rhinorrhea, sinus pressure, sinus pain, sneezing and sore throat  Respiratory: Positive for cough  Negative for hemoptysis, shortness of breath and wheezing  Gastrointestinal: Negative for diarrhea, nausea and vomiting  I have reviewed the patient's Past Medical History  OBJECTIVE:  /60   Pulse 79   Temp (!) 96 8 °F (36 °C)   Resp 16   Wt 62 4 kg (137 lb 8 oz)   LMP 10/01/2019 (Approximate)   SpO2 96%   Breastfeeding? No   BMI 22 88 kg/m²    Physical Exam   Constitutional: She appears well-developed and well-nourished  No distress  HENT:   Head: Normocephalic and atraumatic  Right Ear: External ear normal    Left Ear: External ear normal    Nose: Right sinus exhibits no maxillary sinus tenderness and no frontal sinus tenderness  Left sinus exhibits no maxillary sinus tenderness and no frontal sinus tenderness  Mouth/Throat: Uvula is midline, oropharynx is clear and moist and mucous membranes are normal  No oropharyngeal exudate, posterior oropharyngeal edema, posterior oropharyngeal erythema or tonsillar abscesses  Eyes: Conjunctivae are normal  Right eye exhibits no discharge  Left eye exhibits no discharge  Neck: Normal range of motion  Neck supple  Cardiovascular: Normal rate and normal heart sounds  Pulmonary/Chest: Effort normal and breath sounds normal  No respiratory distress  She has no wheezes  She has no rales  Rare wheezing in LLL   Lymphadenopathy:     She has no cervical adenopathy  Skin: She is not diaphoretic  Vitals reviewed  [facility]  Cheryl Méndez MD    Note: Portions of the record have been created with voice recognition software  Occasional wrong word or "sound a like" substitutions may have occurred due to the inherent limitations of voice recognition software  Read the chart carefully and recognize, using context, where substitutions have occurred

## 2019-10-18 NOTE — PROGRESS NOTES
Chart updated per office request  Discrete reportable data documented  *Updated DM foot exam, DOS 9-25-19

## 2019-10-29 ENCOUNTER — TELEPHONE (OUTPATIENT)
Dept: FAMILY MEDICINE CLINIC | Facility: CLINIC | Age: 39
End: 2019-10-29

## 2019-10-29 DIAGNOSIS — J06.9 ACUTE URI: Primary | ICD-10-CM

## 2019-10-29 RX ORDER — AMOXICILLIN AND CLAVULANATE POTASSIUM 875; 125 MG/1; MG/1
1 TABLET, FILM COATED ORAL EVERY 12 HOURS SCHEDULED
Qty: 14 TABLET | Refills: 0 | Status: SHIPPED | OUTPATIENT
Start: 2019-10-29 | End: 2019-11-05

## 2019-10-29 NOTE — TELEPHONE ENCOUNTER
Patient called  She saw you on 10/18/19 with cough x 7 days  She has been doing everything you told her to do  She has been using inhalers, mucinex , etc and symptoms seem to be worse  She now has severe nasal congestion along w/ the cough and congestion  She states she has yellow bloody mucous nasal discharge   She wants to know if you can call something in for her or if you want to see her again

## 2020-04-22 ENCOUNTER — TELEPHONE (OUTPATIENT)
Dept: FAMILY MEDICINE CLINIC | Facility: CLINIC | Age: 40
End: 2020-04-22

## 2020-04-27 ENCOUNTER — TELEPHONE (OUTPATIENT)
Dept: ADMINISTRATIVE | Facility: OTHER | Age: 40
End: 2020-04-27

## 2020-04-27 ENCOUNTER — TELEMEDICINE (OUTPATIENT)
Dept: FAMILY MEDICINE CLINIC | Facility: CLINIC | Age: 40
End: 2020-04-27

## 2020-04-27 DIAGNOSIS — Z13.220 SCREENING FOR HYPERLIPIDEMIA: ICD-10-CM

## 2020-04-27 DIAGNOSIS — Z00.00 ROUTINE ADULT HEALTH MAINTENANCE: Primary | ICD-10-CM

## 2020-04-27 DIAGNOSIS — Z11.4 ENCOUNTER FOR SCREENING FOR HIV: ICD-10-CM

## 2020-04-27 DIAGNOSIS — E10.9 TYPE 1 DIABETES MELLITUS WITHOUT COMPLICATION (HCC): ICD-10-CM

## 2020-04-27 DIAGNOSIS — G43.829 MENSTRUAL MIGRAINE WITHOUT STATUS MIGRAINOSUS, NOT INTRACTABLE: ICD-10-CM

## 2020-04-27 PROCEDURE — 99214 OFFICE O/P EST MOD 30 MIN: CPT | Performed by: FAMILY MEDICINE

## 2020-07-13 ENCOUNTER — OFFICE VISIT (OUTPATIENT)
Dept: FAMILY MEDICINE CLINIC | Facility: CLINIC | Age: 40
End: 2020-07-13
Payer: COMMERCIAL

## 2020-07-13 VITALS
OXYGEN SATURATION: 99 % | DIASTOLIC BLOOD PRESSURE: 72 MMHG | WEIGHT: 140 LBS | TEMPERATURE: 98.8 F | BODY MASS INDEX: 23.9 KG/M2 | HEART RATE: 78 BPM | HEIGHT: 64 IN | SYSTOLIC BLOOD PRESSURE: 124 MMHG | RESPIRATION RATE: 1 BRPM

## 2020-07-13 DIAGNOSIS — R07.81 RIB PAIN: Primary | ICD-10-CM

## 2020-07-13 DIAGNOSIS — F41.8 ANXIETY ABOUT HEALTH: ICD-10-CM

## 2020-07-13 PROCEDURE — 99213 OFFICE O/P EST LOW 20 MIN: CPT | Performed by: FAMILY MEDICINE

## 2020-07-13 PROCEDURE — 2022F DILAT RTA XM EVC RTNOPTHY: CPT | Performed by: FAMILY MEDICINE

## 2020-07-13 PROCEDURE — 1036F TOBACCO NON-USER: CPT | Performed by: FAMILY MEDICINE

## 2020-07-13 RX ORDER — CYCLOSPORINE 0.5 MG/ML
EMULSION OPHTHALMIC AS NEEDED
COMMUNITY
Start: 2020-07-03

## 2020-07-13 NOTE — PROGRESS NOTES
FAMILY MEDICINE PROGRESS NOTE  Go Lowry 47 y o  female   DATE: July 13, 2020     ASSESSMENT and PLAN:  Go Lowry is a 47 y o  female with:     Problem List Items Addressed This Visit     None      Visit Diagnoses     Rib pain    -  Primary    Anxiety about health           Reassured patient, that the location of the lump that she felt is over her ribs and not her breast tissue  Did perform bilateral breast exam today that was normal without any distinct masses  Advised that she may have some osseous callus formation from previous fracture of the rib, that is likely widest slightly more prominent  Reassured patient, advised warning signs for which to call back and would get repeat imaging at that time  Patient agreeable with the plan and expressed understanding  I discucssed signs and symptoms for which to RTC, go to ER or seek urgent medical care  SUBJECTIVE:  Go Lowry is a 47 y o  female who presents today with a chief complaint of Mass (under left breast)  Patient states that she is concerned about possible lump on her left chest   Patient 1st noticed it when she was in bed a few nights ago, she felt that something was raised  She states that is below her left breast   Patient does not have personal history of breast cancer does have positive family history and in the past was told she has fibrous breast tissue, but she just wanted to be sure  She had an appointment with her gynecologist that was canceled today so she was concerned  Patient denies any pain, difficulty breathing, distinct breast mass or nipple discharge  But she was concerned and like evaluation  In February 2018 patient did have a left rib fracture, chest x-ray at that time showed: Subacute fracture of the left posterior lateral 6th rib with osseous callus formation  No pneumothorax  Review of Systems   Constitutional: Negative for fever  Respiratory: Negative for shortness of breath  Cardiovascular: Negative for chest pain  Musculoskeletal: Negative for arthralgias  Psychiatric/Behavioral: The patient is nervous/anxious  I have reviewed the patient's Past Medical History  OBJECTIVE:  /72   Pulse 78   Temp 98 8 °F (37 1 °C)   Resp (!) 1   Ht 5' 4" (1 626 m)   Wt 63 5 kg (140 lb)   LMP 06/30/2020 (Approximate)   SpO2 99%   Breastfeeding No   BMI 24 03 kg/m²    Physical Exam   Constitutional: She appears well-developed and well-nourished  No distress  Pulmonary/Chest: Chest wall is not dull to percussion  She exhibits no mass, no tenderness, no bony tenderness, no laceration, no crepitus, no edema, no deformity, no swelling and no retraction  Right breast exhibits no inverted nipple, no mass, no nipple discharge, no skin change and no tenderness  Left breast exhibits no inverted nipple, no mass, no nipple discharge, no skin change and no tenderness  No breast swelling, tenderness, discharge or bleeding  Breasts are symmetrical    Skin: She is not diaphoretic  Psychiatric: She has a normal mood and affect  Her speech is normal and behavior is normal  Judgment and thought content normal  Cognition and memory are normal  She expresses no homicidal and no suicidal ideation  Duong Colvin MD    Note: Portions of the record have been created with voice recognition software  Occasional wrong word or "sound a like" substitutions may have occurred due to the inherent limitations of voice recognition software  Read the chart carefully and recognize, using context, where substitutions have occurred

## 2020-07-19 LAB
LEFT EYE DIABETIC RETINOPATHY: NORMAL
RIGHT EYE DIABETIC RETINOPATHY: NORMAL

## 2020-07-29 ENCOUNTER — APPOINTMENT (OUTPATIENT)
Dept: LAB | Facility: HOSPITAL | Age: 40
End: 2020-07-29
Payer: COMMERCIAL

## 2020-07-29 ENCOUNTER — TRANSCRIBE ORDERS (OUTPATIENT)
Dept: LAB | Facility: HOSPITAL | Age: 40
End: 2020-07-29

## 2020-07-29 DIAGNOSIS — E10.9 TYPE 1 DIABETES MELLITUS WITHOUT COMPLICATION (HCC): Primary | ICD-10-CM

## 2020-07-29 DIAGNOSIS — E10.9 TYPE 1 DIABETES MELLITUS WITHOUT COMPLICATION (HCC): ICD-10-CM

## 2020-07-29 DIAGNOSIS — Z13.220 SCREENING FOR HYPERLIPIDEMIA: ICD-10-CM

## 2020-07-29 DIAGNOSIS — Z00.00 ROUTINE ADULT HEALTH MAINTENANCE: ICD-10-CM

## 2020-07-29 DIAGNOSIS — Z11.4 ENCOUNTER FOR SCREENING FOR HIV: ICD-10-CM

## 2020-07-29 LAB
CHOLEST SERPL-MCNC: 174 MG/DL (ref 50–200)
EST. AVERAGE GLUCOSE BLD GHB EST-MCNC: 246 MG/DL
HBA1C MFR BLD: 10.2 %
HDLC SERPL-MCNC: 72 MG/DL
HIV 1+2 AB+HIV1 P24 AG SERPL QL IA: NORMAL
LDLC SERPL CALC-MCNC: 86 MG/DL (ref 0–100)
TRIGL SERPL-MCNC: 79 MG/DL
TSH SERPL DL<=0.05 MIU/L-ACNC: 2.87 UIU/ML (ref 0.36–3.74)

## 2020-07-29 PROCEDURE — 3046F HEMOGLOBIN A1C LEVEL >9.0%: CPT | Performed by: FAMILY MEDICINE

## 2020-07-29 PROCEDURE — 84443 ASSAY THYROID STIM HORMONE: CPT

## 2020-07-29 PROCEDURE — 80061 LIPID PANEL: CPT

## 2020-07-29 PROCEDURE — 83036 HEMOGLOBIN GLYCOSYLATED A1C: CPT

## 2020-07-29 PROCEDURE — 36415 COLL VENOUS BLD VENIPUNCTURE: CPT

## 2020-07-29 PROCEDURE — 87389 HIV-1 AG W/HIV-1&-2 AB AG IA: CPT

## 2020-08-12 ENCOUNTER — ANNUAL EXAM (OUTPATIENT)
Dept: OBGYN CLINIC | Facility: CLINIC | Age: 40
End: 2020-08-12
Payer: COMMERCIAL

## 2020-08-12 VITALS
TEMPERATURE: 98.6 F | SYSTOLIC BLOOD PRESSURE: 122 MMHG | DIASTOLIC BLOOD PRESSURE: 78 MMHG | HEIGHT: 64 IN | BODY MASS INDEX: 24.24 KG/M2 | WEIGHT: 142 LBS

## 2020-08-12 DIAGNOSIS — Z12.39 ENCOUNTER FOR SCREENING BREAST EXAMINATION: ICD-10-CM

## 2020-08-12 DIAGNOSIS — Z01.419 ENCOUNTER FOR WELL WOMAN EXAM: Primary | ICD-10-CM

## 2020-08-12 PROCEDURE — 3008F BODY MASS INDEX DOCD: CPT | Performed by: FAMILY MEDICINE

## 2020-08-12 PROCEDURE — 3046F HEMOGLOBIN A1C LEVEL >9.0%: CPT | Performed by: PHYSICIAN ASSISTANT

## 2020-08-12 PROCEDURE — S0612 ANNUAL GYNECOLOGICAL EXAMINA: HCPCS | Performed by: PHYSICIAN ASSISTANT

## 2020-08-12 NOTE — PROGRESS NOTES
Assessment/Plan:    No problem-specific Assessment & Plan notes found for this encounter  Diagnoses and all orders for this visit:    Encounter for well woman exam    Encounter for screening breast examination          Subjective:      Patient ID: Mio Sky is a 47 y o  female  Pt presents for her annual exam today--  She has no complaints  She has reg bleeding, no pelvic pain  Bowel and bladder are regular  No breast concerns today  IDDM--last hgbA1C was elevated so pt trying to work on that    No pap today  The following portions of the patient's history were reviewed and updated as appropriate: allergies, current medications, past family history, past medical history, past social history, past surgical history and problem list     Review of Systems   Constitutional: Negative for chills, fever and unexpected weight change  Gastrointestinal: Negative for abdominal pain, blood in stool, constipation and diarrhea  Genitourinary: Negative  Objective:      /78   Temp 98 6 °F (37 °C)   Ht 5' 4" (1 626 m)   Wt 64 4 kg (142 lb)   LMP 07/27/2020 (Exact Date)   Breastfeeding No   BMI 24 37 kg/m²          Physical Exam  Vitals signs and nursing note reviewed  Constitutional:       Appearance: She is well-developed  HENT:      Head: Normocephalic and atraumatic  Neck:      Musculoskeletal: Normal range of motion  Chest:      Breasts:         Right: No inverted nipple, mass, nipple discharge or skin change  Left: No inverted nipple, mass, nipple discharge or skin change  Abdominal:      Palpations: Abdomen is soft  Genitourinary:     Exam position: Supine  Labia:         Right: No rash, tenderness or lesion  Left: No rash, tenderness or lesion  Vagina: Normal       Cervix: No cervical motion tenderness, discharge or friability  Adnexa:         Right: No mass, tenderness or fullness  Left: No mass, tenderness or fullness  Lymphadenopathy:      Lower Body: No right inguinal adenopathy  No left inguinal adenopathy

## 2020-08-12 NOTE — PROGRESS NOTES
Patient is here for yearly exam   Patient is having regular cycles  No breast concerns and B&B ok  Patient is not due for pap smear at this visit  2/5/18 Normal Pap, Negative HPV   2/2/17 Normal Pap    12/21/15 Normal Pap

## 2020-09-29 ENCOUNTER — TELEMEDICINE (OUTPATIENT)
Dept: FAMILY MEDICINE CLINIC | Facility: CLINIC | Age: 40
End: 2020-09-29
Payer: COMMERCIAL

## 2020-09-29 DIAGNOSIS — Z20.828 EXPOSURE TO SARS-ASSOCIATED CORONAVIRUS: ICD-10-CM

## 2020-09-29 DIAGNOSIS — J30.89 ENVIRONMENTAL AND SEASONAL ALLERGIES: Primary | ICD-10-CM

## 2020-09-29 PROCEDURE — 99214 OFFICE O/P EST MOD 30 MIN: CPT | Performed by: FAMILY MEDICINE

## 2020-09-29 PROCEDURE — U0003 INFECTIOUS AGENT DETECTION BY NUCLEIC ACID (DNA OR RNA); SEVERE ACUTE RESPIRATORY SYNDROME CORONAVIRUS 2 (SARS-COV-2) (CORONAVIRUS DISEASE [COVID-19]), AMPLIFIED PROBE TECHNIQUE, MAKING USE OF HIGH THROUGHPUT TECHNOLOGIES AS DESCRIBED BY CMS-2020-01-R: HCPCS | Performed by: FAMILY MEDICINE

## 2020-09-29 RX ORDER — FLUTICASONE PROPIONATE 50 MCG
1 SPRAY, SUSPENSION (ML) NASAL DAILY
Qty: 1 BOTTLE | Refills: 0 | Status: SHIPPED | OUTPATIENT
Start: 2020-09-29 | End: 2021-10-27

## 2020-09-29 NOTE — PROGRESS NOTES
COVID-19 Virtual Visit     Assessment/Plan:    Problem List Items Addressed This Visit     None      Visit Diagnoses     Environmental and seasonal allergies    -  Primary    Relevant Medications    fluticasone (FLONASE) 50 mcg/act nasal spray    Exposure to SARS-associated coronavirus        Relevant Orders    Novel Coronavirus (COVID-19), PCR [facility] - Collected at [facility] or [facility]        This virtual check-in was done via [employer] and patient was informed that this is not a secure, HIPAA-complaint platform  She agrees to proceed       Disposition:      Discussed with patient that symptoms are likely allergic related  Employer is requiring COVID-19 testing prior to returning from work  Recommended quarantine until results are available  I referred Kim Mcleod to one of our centralized sites for a COVID-19 swab    I have spent 12 minutes with Patient today in which greater than 50% of this time was spent in counseling/coordination of care regarding Risks and benefits of tx options, Instructions for management, Patient and family education, Importance of treatment compliance and Impressions  Encounter provider Miri Carpenter MD    Provider located at [place] [address]  [phone]    Recent Visits  No visits were found meeting these conditions  Showing recent visits within past 7 days and meeting all other requirements     Today's Visits  Date Type Provider Dept   09/29/20 Telemedicine Antoinette Carpenter MD 2 [facility] today's visits and meeting all other requirements     Future Appointments  No visits were found meeting these conditions  Showing future appointments within next 150 days and meeting all other requirements        Patient agrees to participate in a virtual check in via telephone or video visit instead of presenting to the office to address urgent/immediate medical needs   Patient is aware this is a billable service  After connecting through [employer], the patient was identified by name and date of birth  Brooklyn Carbone was informed that this was a telemedicine visit and that the exam was being conducted confidentially over secure lines  My office door was closed  No one else was in the room  Brooklyn Carbone acknowledged consent and understanding of privacy and security of the telemedicine visit  I informed the patient that I have reviewed her record in Epic and presented the opportunity for her to ask any questions regarding the visit today  The patient agreed to participate  Subjective  Brooklyn Carbone is a 47 y o  female who is concerned about COVID-19  For the past few days she has been having yellow, nasal discharge  But yesterday, she noticed anosmia and aguesia with the nasal congestion, but after she blows her nose her sense of smell/taste does return  She reports anosmia  She has not experienced fever, chills, repeated shaking with chills, cough, shortness of breath, headache, sore throat, anorexia, fatigue, myalgias, abdominal pain, diarrhea, nausea and vomiting She has not had contact with a person who is under investigation for or who is positive for COVID-19 within the last 14 days  She has not been hospitalized recently for fever and/or lower respiratory symptoms      Past Medical History:   Diagnosis Date    Anorexia nervosa     Breast cyst     Diabetes mellitus ([facility] [address] )     Fibroids     Gestational diabetes     Papanicolaou smear for cervical cancer screening 2018    neg    Submucous leiomyoma of uterus        Past Surgical History:   Procedure Laterality Date     SECTION      INDUCED       surgically, x2    TUBAL LIGATION         Current Outpatient Medications   Medication Sig Dispense Refill    ACCU-CHEK FASTCLIX LANCETS MISC ACCU-CHEK FASTCLIX 6time daily EE10 65      BD PEN NEEDLE LEANDRA U/F 32G X 4 MM MISC       fluticasone (FLONASE) 50 mcg/act nasal spray 1 spray into each nostril daily 1 Bottle 0    glucagon (GLUCAGON EMERGENCY) 1 MG injection 1 mg as needed for low blood sugar May repeat dose  every 20 minutes as needed      glucose blood (ACCU-CHEK MUKESH PLUS) test strip 1 each by Other route as needed (hypoglycemia) Use as instructed 100 each 0    insulin aspart (NOVOLOG) 100 units/mL injection Inject under the skin      Insulin Degludec (TRESIBA) 100 UNIT/ML SOLN Inject under the skin      RESTASIS 0 05 % ophthalmic emulsion place 1 drop into both eyes twice a day      SUMAtriptan (IMITREX) 50 mg tablet Take 1 tablet (50 mg total) by mouth once as needed for migraine for up to 1 dose 9 tablet 0     No current facility-administered medications for this visit  No Known Allergies    Review of Systems    Video Exam    There were no vitals filed for this visit  Annika William appears healthy, alert, no distress  Physical Exam     VIRTUAL VISIT [facility] acknowledges that she has consented to an online visit or consultation  She understands that the online visit is based solely on information provided by her, and that, in the absence of a face-to-face physical evaluation by the physician, the diagnosis she receives is both limited and provisional in terms of accuracy and completeness  This is not intended to replace a full medical face-to-face evaluation by the physician  Leonel Sheth understands and accepts these terms

## 2020-09-30 LAB — SARS-COV-2 RNA SPEC QL NAA+PROBE: NOT DETECTED

## 2020-11-20 DIAGNOSIS — J30.89 ENVIRONMENTAL AND SEASONAL ALLERGIES: ICD-10-CM

## 2020-11-20 RX ORDER — FLUTICASONE PROPIONATE 50 MCG
SPRAY, SUSPENSION (ML) NASAL
Qty: 16 ML | Refills: 0 | OUTPATIENT
Start: 2020-11-20

## 2020-11-23 ENCOUNTER — TRANSCRIBE ORDERS (OUTPATIENT)
Dept: LAB | Facility: CLINIC | Age: 40
End: 2020-11-23

## 2020-11-23 ENCOUNTER — LAB (OUTPATIENT)
Dept: LAB | Facility: CLINIC | Age: 40
End: 2020-11-23
Payer: COMMERCIAL

## 2020-11-23 DIAGNOSIS — E10.69 TYPE 1 DIABETES MELLITUS WITH OTHER SPECIFIED COMPLICATION (HCC): Primary | ICD-10-CM

## 2020-11-23 DIAGNOSIS — E10.69 TYPE 1 DIABETES MELLITUS WITH OTHER SPECIFIED COMPLICATION (HCC): ICD-10-CM

## 2020-11-23 LAB
ANION GAP SERPL CALCULATED.3IONS-SCNC: 10 MMOL/L (ref 4–13)
BUN SERPL-MCNC: 8 MG/DL (ref 5–25)
CALCIUM SERPL-MCNC: 9.4 MG/DL (ref 8.3–10.1)
CHLORIDE SERPL-SCNC: 100 MMOL/L (ref 100–108)
CO2 SERPL-SCNC: 26 MMOL/L (ref 21–32)
CREAT SERPL-MCNC: 0.54 MG/DL (ref 0.6–1.3)
CREAT UR-MCNC: 18.3 MG/DL
EST. AVERAGE GLUCOSE BLD GHB EST-MCNC: 212 MG/DL
GFR SERPL CREATININE-BSD FRML MDRD: 120 ML/MIN/1.73SQ M
GLUCOSE SERPL-MCNC: 197 MG/DL (ref 65–140)
HBA1C MFR BLD: 9 %
MICROALBUMIN UR-MCNC: <5 MG/L (ref 0–20)
MICROALBUMIN/CREAT 24H UR: <27 MG/G CREATININE (ref 0–30)
POTASSIUM SERPL-SCNC: 3.4 MMOL/L (ref 3.5–5.3)
SODIUM SERPL-SCNC: 136 MMOL/L (ref 136–145)

## 2020-11-23 PROCEDURE — 80048 BASIC METABOLIC PNL TOTAL CA: CPT

## 2020-11-23 PROCEDURE — 82570 ASSAY OF URINE CREATININE: CPT

## 2020-11-23 PROCEDURE — 82043 UR ALBUMIN QUANTITATIVE: CPT

## 2020-11-23 PROCEDURE — 83036 HEMOGLOBIN GLYCOSYLATED A1C: CPT

## 2020-11-23 PROCEDURE — 36415 COLL VENOUS BLD VENIPUNCTURE: CPT

## 2021-01-08 ENCOUNTER — IMMUNIZATIONS (OUTPATIENT)
Dept: FAMILY MEDICINE CLINIC | Facility: HOSPITAL | Age: 41
End: 2021-01-08

## 2021-01-08 DIAGNOSIS — Z23 ENCOUNTER FOR IMMUNIZATION: ICD-10-CM

## 2021-01-08 PROCEDURE — 0011A SARS-COV-2 / COVID-19 MRNA VACCINE (MODERNA) 100 MCG: CPT

## 2021-01-08 PROCEDURE — 91301 SARS-COV-2 / COVID-19 MRNA VACCINE (MODERNA) 100 MCG: CPT

## 2021-01-22 ENCOUNTER — VBI (OUTPATIENT)
Dept: ADMINISTRATIVE | Facility: OTHER | Age: 41
End: 2021-01-22

## 2021-02-03 ENCOUNTER — IMMUNIZATIONS (OUTPATIENT)
Dept: FAMILY MEDICINE CLINIC | Facility: HOSPITAL | Age: 41
End: 2021-02-03

## 2021-02-03 DIAGNOSIS — Z23 ENCOUNTER FOR IMMUNIZATION: Primary | ICD-10-CM

## 2021-02-03 PROCEDURE — 0012A SARS-COV-2 / COVID-19 MRNA VACCINE (MODERNA) 100 MCG: CPT

## 2021-02-03 PROCEDURE — 91301 SARS-COV-2 / COVID-19 MRNA VACCINE (MODERNA) 100 MCG: CPT

## 2021-03-17 ENCOUNTER — OFFICE VISIT (OUTPATIENT)
Dept: FAMILY MEDICINE CLINIC | Facility: CLINIC | Age: 41
End: 2021-03-17
Payer: COMMERCIAL

## 2021-03-17 VITALS
TEMPERATURE: 97.8 F | WEIGHT: 149.5 LBS | HEART RATE: 90 BPM | BODY MASS INDEX: 25.52 KG/M2 | OXYGEN SATURATION: 98 % | DIASTOLIC BLOOD PRESSURE: 74 MMHG | HEIGHT: 64 IN | SYSTOLIC BLOOD PRESSURE: 110 MMHG

## 2021-03-17 DIAGNOSIS — E10.9 TYPE 1 DIABETES MELLITUS WITHOUT COMPLICATION (HCC): ICD-10-CM

## 2021-03-17 DIAGNOSIS — I73.00 RAYNAUD'S DISEASE WITHOUT GANGRENE: ICD-10-CM

## 2021-03-17 DIAGNOSIS — H04.123 DRY EYES: ICD-10-CM

## 2021-03-17 DIAGNOSIS — Z00.00 ANNUAL PHYSICAL EXAM: Primary | ICD-10-CM

## 2021-03-17 PROCEDURE — 99395 PREV VISIT EST AGE 18-39: CPT | Performed by: FAMILY MEDICINE

## 2021-03-17 PROCEDURE — 1036F TOBACCO NON-USER: CPT | Performed by: FAMILY MEDICINE

## 2021-03-17 PROCEDURE — 3008F BODY MASS INDEX DOCD: CPT | Performed by: FAMILY MEDICINE

## 2021-03-17 NOTE — ASSESSMENT & PLAN NOTE
Not well managed on current regimen, though not using Restasis any longer  Consider possible autoimmune/rheumatologic condition, she does have some features concerning for Raynaud's (red toe) so possible CREST/Sjogrens  Consider work-up if symptoms worsen

## 2021-03-17 NOTE — ASSESSMENT & PLAN NOTE
Lab Results   Component Value Date    HGBA1C 9 0 (H) 11/23/2020    HGBA1C 10 2 (H) 07/29/2020    HGBA1C 9 7 09/25/2019     Lab Results   Component Value Date    LDLCALC 86 07/29/2020    CREATININE 0 54 (L) 11/23/2020     Diagnosed following first pregnancy   Not optimally controlled on current regimen  Follows with endocrinology

## 2021-05-18 ENCOUNTER — VBI (OUTPATIENT)
Dept: ADMINISTRATIVE | Facility: OTHER | Age: 41
End: 2021-05-18

## 2021-05-24 LAB
LEFT EYE DIABETIC RETINOPATHY: NORMAL
RIGHT EYE DIABETIC RETINOPATHY: NORMAL

## 2021-09-01 ENCOUNTER — ANNUAL EXAM (OUTPATIENT)
Dept: OBGYN CLINIC | Facility: CLINIC | Age: 41
End: 2021-09-01
Payer: COMMERCIAL

## 2021-09-01 VITALS — WEIGHT: 149 LBS | BODY MASS INDEX: 25.58 KG/M2 | SYSTOLIC BLOOD PRESSURE: 124 MMHG | DIASTOLIC BLOOD PRESSURE: 80 MMHG

## 2021-09-01 DIAGNOSIS — Z01.419 CERVICAL SMEAR, AS PART OF ROUTINE GYNECOLOGICAL EXAMINATION: ICD-10-CM

## 2021-09-01 DIAGNOSIS — N94.10 DYSPAREUNIA, FEMALE: ICD-10-CM

## 2021-09-01 DIAGNOSIS — Z11.51 SPECIAL SCREENING EXAMINATION FOR HUMAN PAPILLOMAVIRUS (HPV): ICD-10-CM

## 2021-09-01 DIAGNOSIS — N64.4 BILATERAL MASTODYNIA: ICD-10-CM

## 2021-09-01 DIAGNOSIS — Z01.419 ENCOUNTER FOR WELL WOMAN EXAM: Primary | ICD-10-CM

## 2021-09-01 DIAGNOSIS — R10.32 LLQ ABDOMINAL PAIN: ICD-10-CM

## 2021-09-01 DIAGNOSIS — Z80.3 FAMILY HISTORY OF BREAST CANCER IN FEMALE: ICD-10-CM

## 2021-09-01 DIAGNOSIS — N30.00 ACUTE CYSTITIS WITHOUT HEMATURIA: ICD-10-CM

## 2021-09-01 DIAGNOSIS — Z12.39 ENCOUNTER FOR SCREENING BREAST EXAMINATION: ICD-10-CM

## 2021-09-01 LAB
SL AMB  POCT GLUCOSE, UA: 1000
SL AMB LEUKOCYTE ESTERASE,UA: ABNORMAL
SL AMB POCT BILIRUBIN,UA: NEGATIVE
SL AMB POCT BLOOD,UA: NEGATIVE
SL AMB POCT KETONES,UA: NEGATIVE
SL AMB POCT NITRITE,UA: NEGATIVE
SL AMB POCT PH,UA: 6
SL AMB POCT SPECIFIC GRAVITY,UA: NEGATIVE
SL AMB POCT URINE PROTEIN: NEGATIVE
SL AMB POCT UROBILINOGEN: NEGATIVE

## 2021-09-01 PROCEDURE — G0145 SCR C/V CYTO,THINLAYER,RESCR: HCPCS | Performed by: PHYSICIAN ASSISTANT

## 2021-09-01 PROCEDURE — 81002 URINALYSIS NONAUTO W/O SCOPE: CPT | Performed by: PHYSICIAN ASSISTANT

## 2021-09-01 PROCEDURE — G0476 HPV COMBO ASSAY CA SCREEN: HCPCS | Performed by: PHYSICIAN ASSISTANT

## 2021-09-01 PROCEDURE — S0612 ANNUAL GYNECOLOGICAL EXAMINA: HCPCS | Performed by: PHYSICIAN ASSISTANT

## 2021-09-01 RX ORDER — NITROFURANTOIN 25; 75 MG/1; MG/1
100 CAPSULE ORAL 2 TIMES DAILY
Qty: 14 CAPSULE | Refills: 1 | Status: SHIPPED | OUTPATIENT
Start: 2021-09-01 | End: 2021-09-08

## 2021-09-01 NOTE — PROGRESS NOTES
Patient is here for yearly exam   Patient is having regular cycles  Patient also having some LLQ pain, with intercourse, and randomly with sitting  Patient also has some lower back pain  Patient had some tingling/pain under her left breast/arm pit  Patients Marija Medley was just recently diagnosed with breast cancer and patient is concerned  Patient is due for a pap smear with HPV testing at this visit  2/5/18 Normal Pap, Negative HPV   2/2/17 Normal Pap    12/21/15 Normal Pap

## 2021-09-02 NOTE — PROGRESS NOTES
Assessment/Plan:    No problem-specific Assessment & Plan notes found for this encounter  Diagnoses and all orders for this visit:    Encounter for well woman exam    Encounter for screening breast examination    Cervical smear, as part of routine gynecological examination  -     Liquid-based pap, screening    Special screening examination for human papillomavirus (HPV)  -     Liquid-based pap, screening    LLQ abdominal pain  -     POCT urine dip  -     US pelvis complete non OB; Future    Dyspareunia, female  -     US pelvis complete non OB; Future    Bilateral mastodynia  -     Mammo diagnostic bilateral w 3d & cad; Future    Family history of breast cancer in female  -     Mammo diagnostic bilateral w 3d & cad; Future    Acute cystitis without hematuria  -     nitrofurantoin (MACROBID) 100 mg capsule; Take 1 capsule (100 mg total) by mouth 2 (two) times a day for 7 days          Subjective:      Patient ID: Faith Stewart is a 46 y o  female  Pt presents for her annual exam today--  She has complaints of LLQ pain and back pain, pain with sitting for a few weeks now  Pain with intercourse  Bowel and bladder are ok except does have some bladder pressure  She has regular bleeding/periods  No breast concerns today except occ left breast pain  Family history of Breast Ca  Last mammo--never, but pt would like one    pap today  rx mammo  UA +  rx macrobid  Hydrate  Check US--rx given      The following portions of the patient's history were reviewed and updated as appropriate: allergies, current medications, past family history, past medical history, past social history, past surgical history and problem list     Review of Systems   Constitutional: Negative for chills, fever and unexpected weight change  Gastrointestinal: Negative for abdominal pain, blood in stool, constipation and diarrhea  Genitourinary: Negative            Objective:      /80   Wt 67 6 kg (149 lb)   LMP 08/15/2021 (Exact Date)   Breastfeeding No   BMI 25 58 kg/m²          Physical Exam  Vitals and nursing note reviewed  Constitutional:       Appearance: She is well-developed  HENT:      Head: Normocephalic and atraumatic  Chest:      Breasts:         Right: No inverted nipple, mass, nipple discharge or skin change  Left: No inverted nipple, mass, nipple discharge or skin change  Abdominal:      Palpations: Abdomen is soft  Genitourinary:     Exam position: Supine  Labia:         Right: No rash, tenderness or lesion  Left: No rash, tenderness or lesion  Vagina: Normal       Cervix: No cervical motion tenderness, discharge or friability  Adnexa:         Right: No mass, tenderness or fullness  Left: No mass, tenderness or fullness  Musculoskeletal:      Cervical back: Normal range of motion  Lymphadenopathy:      Lower Body: No right inguinal adenopathy  No left inguinal adenopathy

## 2021-09-04 LAB
HPV HR 12 DNA CVX QL NAA+PROBE: NEGATIVE
HPV16 DNA CVX QL NAA+PROBE: NEGATIVE
HPV18 DNA CVX QL NAA+PROBE: NEGATIVE

## 2021-09-08 ENCOUNTER — HOSPITAL ENCOUNTER (OUTPATIENT)
Dept: ULTRASOUND IMAGING | Facility: HOSPITAL | Age: 41
Discharge: HOME | End: 2021-09-08
Payer: COMMERCIAL

## 2021-09-08 DIAGNOSIS — R10.32 LLQ ABDOMINAL PAIN: ICD-10-CM

## 2021-09-08 DIAGNOSIS — N94.10 DYSPAREUNIA, FEMALE: ICD-10-CM

## 2021-09-08 LAB
HBA1C MFR BLD HPLC: 9.5 %
LAB AP GYN PRIMARY INTERPRETATION: NORMAL
Lab: NORMAL

## 2021-09-08 PROCEDURE — 76830 TRANSVAGINAL US NON-OB: CPT

## 2021-09-08 PROCEDURE — 76856 US EXAM PELVIC COMPLETE: CPT

## 2021-10-01 ENCOUNTER — TELEPHONE (OUTPATIENT)
Dept: FAMILY MEDICINE CLINIC | Facility: CLINIC | Age: 41
End: 2021-10-01

## 2021-10-01 ENCOUNTER — LAB REQUISITION (OUTPATIENT)
Dept: LAB | Facility: HOSPITAL | Age: 41
End: 2021-10-01
Payer: COMMERCIAL

## 2021-10-01 DIAGNOSIS — E10.9 TYPE 1 DIABETES MELLITUS WITHOUT COMPLICATION (HCC): Primary | ICD-10-CM

## 2021-10-01 DIAGNOSIS — H10.32 UNSPECIFIED ACUTE CONJUNCTIVITIS, LEFT EYE: ICD-10-CM

## 2021-10-01 PROCEDURE — 87070 CULTURE OTHR SPECIMN AEROBIC: CPT | Performed by: NURSE PRACTITIONER

## 2021-10-01 PROCEDURE — 87205 SMEAR GRAM STAIN: CPT | Performed by: NURSE PRACTITIONER

## 2021-10-01 PROCEDURE — 87186 SC STD MICRODIL/AGAR DIL: CPT | Performed by: NURSE PRACTITIONER

## 2021-10-03 LAB
BACTERIA WND AEROBE CULT: ABNORMAL
GRAM STN SPEC: ABNORMAL

## 2021-10-06 ENCOUNTER — HOSPITAL ENCOUNTER (OUTPATIENT)
Dept: RADIOLOGY | Facility: HOSPITAL | Age: 41
Discharge: HOME | End: 2021-10-06
Payer: COMMERCIAL

## 2021-10-06 ENCOUNTER — APPOINTMENT (OUTPATIENT)
Dept: LAB | Facility: HOSPITAL | Age: 41
End: 2021-10-06
Payer: COMMERCIAL

## 2021-10-06 VITALS — HEIGHT: 64 IN | WEIGHT: 144 LBS | BODY MASS INDEX: 24.59 KG/M2

## 2021-10-06 DIAGNOSIS — Z80.3 FAMILY HISTORY OF BREAST CANCER IN FEMALE: ICD-10-CM

## 2021-10-06 DIAGNOSIS — E10.9 TYPE 1 DIABETES MELLITUS WITHOUT COMPLICATION (HCC): ICD-10-CM

## 2021-10-06 DIAGNOSIS — N64.4 MASTODYNIA: ICD-10-CM

## 2021-10-06 DIAGNOSIS — N64.4 BILATERAL MASTODYNIA: ICD-10-CM

## 2021-10-06 LAB
CREAT UR-MCNC: 27.3 MG/DL
MICROALBUMIN UR-MCNC: <5 MG/L (ref 0–20)
MICROALBUMIN/CREAT 24H UR: <18 MG/G CREATININE (ref 0–30)

## 2021-10-06 PROCEDURE — 76642 ULTRASOUND BREAST LIMITED: CPT

## 2021-10-06 PROCEDURE — 82570 ASSAY OF URINE CREATININE: CPT

## 2021-10-06 PROCEDURE — 82043 UR ALBUMIN QUANTITATIVE: CPT

## 2021-10-06 PROCEDURE — G0279 TOMOSYNTHESIS, MAMMO: HCPCS

## 2021-10-06 PROCEDURE — 77066 DX MAMMO INCL CAD BI: CPT

## 2021-10-18 ENCOUNTER — VBI (OUTPATIENT)
Dept: ADMINISTRATIVE | Facility: OTHER | Age: 41
End: 2021-10-18

## 2021-10-20 ENCOUNTER — APPOINTMENT (OUTPATIENT)
Dept: LAB | Facility: CLINIC | Age: 41
End: 2021-10-20
Payer: COMMERCIAL

## 2021-10-20 LAB
ALBUMIN SERPL BCP-MCNC: 3.6 G/DL (ref 3.5–5)
ALP SERPL-CCNC: 53 U/L (ref 46–116)
ALT SERPL W P-5'-P-CCNC: 17 U/L (ref 12–78)
ANION GAP SERPL CALCULATED.3IONS-SCNC: 13 MMOL/L (ref 4–13)
AST SERPL W P-5'-P-CCNC: 11 U/L (ref 5–45)
BILIRUB SERPL-MCNC: 0.3 MG/DL (ref 0.2–1)
BUN SERPL-MCNC: 10 MG/DL (ref 5–25)
CALCIUM SERPL-MCNC: 8.3 MG/DL (ref 8.3–10.1)
CHLORIDE SERPL-SCNC: 102 MMOL/L (ref 100–108)
CHOLEST SERPL-MCNC: 195 MG/DL (ref 50–200)
CO2 SERPL-SCNC: 27 MMOL/L (ref 21–32)
CREAT SERPL-MCNC: 0.6 MG/DL (ref 0.6–1.3)
GFR SERPL CREATININE-BSD FRML MDRD: 115 ML/MIN/1.73SQ M
GLUCOSE P FAST SERPL-MCNC: 201 MG/DL (ref 65–99)
HDLC SERPL-MCNC: 77 MG/DL
LDLC SERPL CALC-MCNC: 105 MG/DL (ref 0–100)
NONHDLC SERPL-MCNC: 118 MG/DL
POTASSIUM SERPL-SCNC: 3.9 MMOL/L (ref 3.5–5.3)
PROT SERPL-MCNC: 6.8 G/DL (ref 6.4–8.2)
SODIUM SERPL-SCNC: 142 MMOL/L (ref 136–145)
TRIGL SERPL-MCNC: 65 MG/DL

## 2021-10-20 PROCEDURE — 80061 LIPID PANEL: CPT

## 2021-10-20 PROCEDURE — 80053 COMPREHEN METABOLIC PANEL: CPT

## 2021-10-20 PROCEDURE — 36415 COLL VENOUS BLD VENIPUNCTURE: CPT

## 2021-10-26 PROBLEM — E78.5 DYSLIPIDEMIA: Status: ACTIVE | Noted: 2021-10-26

## 2021-10-27 ENCOUNTER — TELEPHONE (OUTPATIENT)
Dept: ADMINISTRATIVE | Facility: OTHER | Age: 41
End: 2021-10-27

## 2021-10-27 ENCOUNTER — OFFICE VISIT (OUTPATIENT)
Dept: FAMILY MEDICINE CLINIC | Facility: CLINIC | Age: 41
End: 2021-10-27
Payer: COMMERCIAL

## 2021-10-27 VITALS
HEART RATE: 86 BPM | DIASTOLIC BLOOD PRESSURE: 70 MMHG | TEMPERATURE: 97.7 F | HEIGHT: 64 IN | WEIGHT: 147.5 LBS | SYSTOLIC BLOOD PRESSURE: 118 MMHG | BODY MASS INDEX: 25.18 KG/M2 | RESPIRATION RATE: 16 BRPM

## 2021-10-27 DIAGNOSIS — H16.202 KERATOCONJUNCTIVITIS OF LEFT EYE: ICD-10-CM

## 2021-10-27 DIAGNOSIS — E10.9 TYPE 1 DIABETES MELLITUS WITHOUT COMPLICATION (HCC): Primary | ICD-10-CM

## 2021-10-27 DIAGNOSIS — H04.123 DRY EYES: ICD-10-CM

## 2021-10-27 DIAGNOSIS — Z23 ENCOUNTER FOR IMMUNIZATION: ICD-10-CM

## 2021-10-27 DIAGNOSIS — E78.5 DYSLIPIDEMIA: ICD-10-CM

## 2021-10-27 PROCEDURE — 90471 IMMUNIZATION ADMIN: CPT

## 2021-10-27 PROCEDURE — 3725F SCREEN DEPRESSION PERFORMED: CPT | Performed by: FAMILY MEDICINE

## 2021-10-27 PROCEDURE — 99214 OFFICE O/P EST MOD 30 MIN: CPT | Performed by: FAMILY MEDICINE

## 2021-10-27 PROCEDURE — 1036F TOBACCO NON-USER: CPT | Performed by: FAMILY MEDICINE

## 2021-10-27 PROCEDURE — 90686 IIV4 VACC NO PRSV 0.5 ML IM: CPT

## 2021-10-27 RX ORDER — VALACYCLOVIR HYDROCHLORIDE 1 G/1
TABLET, FILM COATED ORAL
COMMUNITY
Start: 2021-10-13

## 2021-10-27 RX ORDER — GANCICLOVIR 1.5 MG/G
GEL OPHTHALMIC AS NEEDED
COMMUNITY
Start: 2021-10-01

## 2021-10-27 RX ORDER — LOTEPREDNOL ETABONATE 3.8 MG/G
GEL OPHTHALMIC AS NEEDED
COMMUNITY
Start: 2021-09-23

## 2021-10-27 RX ORDER — ERYTHROMYCIN 5 MG/G
OINTMENT OPHTHALMIC
COMMUNITY
Start: 2021-10-01

## 2021-11-02 ENCOUNTER — VBI (OUTPATIENT)
Dept: ADMINISTRATIVE | Facility: OTHER | Age: 41
End: 2021-11-02

## 2021-11-17 ENCOUNTER — APPOINTMENT (OUTPATIENT)
Dept: LAB | Facility: CLINIC | Age: 41
End: 2021-11-17
Payer: COMMERCIAL

## 2021-11-17 DIAGNOSIS — H16.202 KERATOCONJUNCTIVITIS OF LEFT EYE: ICD-10-CM

## 2021-11-17 LAB
BASOPHILS # BLD AUTO: 0.08 THOUSANDS/ΜL (ref 0–0.1)
BASOPHILS NFR BLD AUTO: 2 % (ref 0–1)
CRP SERPL QL: <3 MG/L
EOSINOPHIL # BLD AUTO: 0.1 THOUSAND/ΜL (ref 0–0.61)
EOSINOPHIL NFR BLD AUTO: 2 % (ref 0–6)
ERYTHROCYTE [DISTWIDTH] IN BLOOD BY AUTOMATED COUNT: 13.2 % (ref 11.6–15.1)
ERYTHROCYTE [SEDIMENTATION RATE] IN BLOOD: 8 MM/HOUR (ref 0–19)
HCT VFR BLD AUTO: 41.7 % (ref 34.8–46.1)
HGB BLD-MCNC: 12.9 G/DL (ref 11.5–15.4)
IMM GRANULOCYTES # BLD AUTO: 0.01 THOUSAND/UL (ref 0–0.2)
IMM GRANULOCYTES NFR BLD AUTO: 0 % (ref 0–2)
LYMPHOCYTES # BLD AUTO: 1.89 THOUSANDS/ΜL (ref 0.6–4.47)
LYMPHOCYTES NFR BLD AUTO: 39 % (ref 14–44)
MCH RBC QN AUTO: 29.3 PG (ref 26.8–34.3)
MCHC RBC AUTO-ENTMCNC: 30.9 G/DL (ref 31.4–37.4)
MCV RBC AUTO: 95 FL (ref 82–98)
MONOCYTES # BLD AUTO: 0.29 THOUSAND/ΜL (ref 0.17–1.22)
MONOCYTES NFR BLD AUTO: 6 % (ref 4–12)
NEUTROPHILS # BLD AUTO: 2.49 THOUSANDS/ΜL (ref 1.85–7.62)
NEUTS SEG NFR BLD AUTO: 51 % (ref 43–75)
NRBC BLD AUTO-RTO: 0 /100 WBCS
PLATELET # BLD AUTO: 261 THOUSANDS/UL (ref 149–390)
PMV BLD AUTO: 13.2 FL (ref 8.9–12.7)
RBC # BLD AUTO: 4.41 MILLION/UL (ref 3.81–5.12)
RHEUMATOID FACT SER QL LA: NEGATIVE
TSH SERPL DL<=0.05 MIU/L-ACNC: 2.34 UIU/ML (ref 0.36–3.74)
WBC # BLD AUTO: 4.86 THOUSAND/UL (ref 4.31–10.16)

## 2021-11-17 PROCEDURE — 84443 ASSAY THYROID STIM HORMONE: CPT

## 2021-11-17 PROCEDURE — 85025 COMPLETE CBC W/AUTO DIFF WBC: CPT

## 2021-11-17 PROCEDURE — 86140 C-REACTIVE PROTEIN: CPT

## 2021-11-17 PROCEDURE — 86038 ANTINUCLEAR ANTIBODIES: CPT

## 2021-11-17 PROCEDURE — 86430 RHEUMATOID FACTOR TEST QUAL: CPT

## 2021-11-17 PROCEDURE — 86235 NUCLEAR ANTIGEN ANTIBODY: CPT

## 2021-11-17 PROCEDURE — 36415 COLL VENOUS BLD VENIPUNCTURE: CPT

## 2021-11-17 PROCEDURE — 86200 CCP ANTIBODY: CPT

## 2021-11-17 PROCEDURE — 85652 RBC SED RATE AUTOMATED: CPT

## 2021-11-17 PROCEDURE — 86225 DNA ANTIBODY NATIVE: CPT

## 2021-11-18 LAB
DSDNA AB SER-ACNC: <1 IU/ML (ref 0–9)
ENA SS-A AB SER-ACNC: <0.2 AI (ref 0–0.9)
ENA SS-B AB SER-ACNC: <0.2 AI (ref 0–0.9)

## 2021-11-19 LAB
CCP AB SER IA-ACNC: 0.6
RYE IGE QN: NEGATIVE

## 2022-01-05 ENCOUNTER — APPOINTMENT (OUTPATIENT)
Dept: LAB | Facility: CLINIC | Age: 42
End: 2022-01-05
Payer: COMMERCIAL

## 2022-01-05 DIAGNOSIS — E10.65 TYPE I DIABETES MELLITUS WITH HYPEROSMOLAR COMA (HCC): ICD-10-CM

## 2022-01-05 DIAGNOSIS — E10.69 TYPE I DIABETES MELLITUS WITH HYPEROSMOLAR COMA (HCC): ICD-10-CM

## 2022-01-05 LAB
ANION GAP SERPL CALCULATED.3IONS-SCNC: 11 MMOL/L (ref 4–13)
BUN SERPL-MCNC: 12 MG/DL (ref 5–25)
CALCIUM SERPL-MCNC: 9.2 MG/DL (ref 8.3–10.1)
CHLORIDE SERPL-SCNC: 101 MMOL/L (ref 100–108)
CO2 SERPL-SCNC: 24 MMOL/L (ref 21–32)
CREAT SERPL-MCNC: 0.6 MG/DL (ref 0.6–1.3)
GFR SERPL CREATININE-BSD FRML MDRD: 115 ML/MIN/1.73SQ M
GLUCOSE SERPL-MCNC: 264 MG/DL (ref 65–140)
POTASSIUM SERPL-SCNC: 3.8 MMOL/L (ref 3.5–5.3)
SODIUM SERPL-SCNC: 136 MMOL/L (ref 136–145)

## 2022-01-05 PROCEDURE — 80048 BASIC METABOLIC PNL TOTAL CA: CPT

## 2022-01-05 PROCEDURE — 83036 HEMOGLOBIN GLYCOSYLATED A1C: CPT

## 2022-01-05 PROCEDURE — 36415 COLL VENOUS BLD VENIPUNCTURE: CPT

## 2022-01-06 LAB
EST. AVERAGE GLUCOSE BLD GHB EST-MCNC: 252 MG/DL
HBA1C MFR BLD: 10.4 %

## 2022-01-28 ENCOUNTER — VBI (OUTPATIENT)
Dept: ADMINISTRATIVE | Facility: OTHER | Age: 42
End: 2022-01-28

## 2022-04-08 ENCOUNTER — APPOINTMENT (OUTPATIENT)
Dept: LAB | Facility: CLINIC | Age: 42
End: 2022-04-08
Payer: COMMERCIAL

## 2022-04-08 DIAGNOSIS — M35.01 SICCA SYNDROME WITH KERATOCONJUNCTIVITIS (HCC): ICD-10-CM

## 2022-04-08 PROCEDURE — 82784 ASSAY IGA/IGD/IGG/IGM EACH: CPT

## 2022-04-08 PROCEDURE — 84165 PROTEIN E-PHORESIS SERUM: CPT | Performed by: SPECIALIST

## 2022-04-08 PROCEDURE — 86231 EMA EACH IG CLASS: CPT

## 2022-04-08 PROCEDURE — 36415 COLL VENOUS BLD VENIPUNCTURE: CPT

## 2022-04-08 PROCEDURE — 86364 TISS TRNSGLTMNASE EA IG CLAS: CPT

## 2022-04-08 PROCEDURE — 86235 NUCLEAR ANTIGEN ANTIBODY: CPT

## 2022-04-08 PROCEDURE — 83520 IMMUNOASSAY QUANT NOS NONAB: CPT

## 2022-04-08 PROCEDURE — 82164 ANGIOTENSIN I ENZYME TEST: CPT

## 2022-04-08 PROCEDURE — 84165 PROTEIN E-PHORESIS SERUM: CPT

## 2022-04-08 PROCEDURE — 86037 ANCA TITER EACH ANTIBODY: CPT

## 2022-04-08 PROCEDURE — 86258 DGP ANTIBODY EACH IG CLASS: CPT

## 2022-04-09 LAB
ENA SS-A AB SER-ACNC: <0.2 AI (ref 0–0.9)
ENA SS-B AB SER-ACNC: <0.2 AI (ref 0–0.9)
ENDOMYSIUM IGA SER QL: NEGATIVE
GLIADIN PEPTIDE IGA SER-ACNC: 7 UNITS (ref 0–19)
GLIADIN PEPTIDE IGG SER-ACNC: 2 UNITS (ref 0–19)
IGA SERPL-MCNC: 192 MG/DL (ref 87–352)
TTG IGA SER-ACNC: <2 U/ML (ref 0–3)
TTG IGG SER-ACNC: <2 U/ML (ref 0–5)

## 2022-04-11 LAB
ACE SERPL-CCNC: 47 U/L (ref 14–82)
ALBUMIN SERPL ELPH-MCNC: 4.5 G/DL (ref 3.5–5)
ALBUMIN SERPL ELPH-MCNC: 62.5 % (ref 52–65)
ALPHA1 GLOB SERPL ELPH-MCNC: 0.3 G/DL (ref 0.1–0.4)
ALPHA1 GLOB SERPL ELPH-MCNC: 4.1 % (ref 2.5–5)
ALPHA2 GLOB SERPL ELPH-MCNC: 0.71 G/DL (ref 0.4–1.2)
ALPHA2 GLOB SERPL ELPH-MCNC: 9.8 % (ref 7–13)
BETA GLOB ABNORMAL SERPL ELPH-MCNC: 0.5 G/DL (ref 0.4–0.8)
BETA1 GLOB SERPL ELPH-MCNC: 6.9 % (ref 5–13)
BETA2 GLOB SERPL ELPH-MCNC: 4.7 % (ref 2–8)
BETA2+GAMMA GLOB SERPL ELPH-MCNC: 0.34 G/DL (ref 0.2–0.5)
GAMMA GLOB ABNORMAL SERPL ELPH-MCNC: 0.86 G/DL (ref 0.5–1.6)
GAMMA GLOB SERPL ELPH-MCNC: 12 % (ref 12–22)
IGG/ALB SER: 1.67 {RATIO} (ref 1.1–1.8)
PROT PATTERN SERPL ELPH-IMP: NORMAL
PROT SERPL-MCNC: 7.2 G/DL (ref 6.4–8.2)

## 2022-04-12 LAB
C-ANCA TITR SER IF: NORMAL TITER
MYELOPEROXIDASE AB SER IA-ACNC: <9 U/ML (ref 0–9)
P-ANCA ATYPICAL TITR SER IF: NORMAL TITER
P-ANCA TITR SER IF: NORMAL TITER
PROTEINASE3 AB SER IA-ACNC: <3.5 U/ML (ref 0–3.5)

## 2022-04-13 ENCOUNTER — HOSPITAL ENCOUNTER (OUTPATIENT)
Dept: RADIOLOGY | Facility: HOSPITAL | Age: 42
Discharge: HOME | End: 2022-04-13
Payer: COMMERCIAL

## 2022-04-13 DIAGNOSIS — R92.8 ABNORMAL FINDINGS ON DIAGNOSTIC IMAGING OF BREAST: ICD-10-CM

## 2022-04-13 PROCEDURE — 76642 ULTRASOUND BREAST LIMITED: CPT

## 2022-05-11 PROCEDURE — 88305 TISSUE EXAM BY PATHOLOGIST: CPT | Performed by: PATHOLOGY

## 2022-05-24 RX ORDER — INSULIN DEGLUDEC 200 U/ML
INJECTION, SOLUTION SUBCUTANEOUS
COMMUNITY
Start: 2022-05-09

## 2022-05-24 NOTE — PROGRESS NOTES
WELL WOMAN ANNUAL PHYSICAL      Date of Service: 22  Primary Care Provider:   Steve Coleman MD       Name: Angie Landa       : 1980       Age:41 y o  Sex: female      MRN: [id number]      Chief Complaint:Annual Exam (Yearly physical/)       Assessment and Plan:  46 y o  female exam      1  Health Maintenance  - Cervical Cancer Screening:follows GYN, last PAP in 2021, normal with negative HPV  - Breast Cancer Screening: she has mammogram  - Labs: done previously for endocrine  - Immunizations: Reviewed  Recommend yearly flu vaccine  2  Other diagnoses addressed today:   Problem List Items Addressed This Visit        Digestive    BRBPR (bright red blood per rectum)     Patient with episodes of BRBPR, likely hemorrhoidal bleeding given that it only occurs with wiping  Recommended OTC hemorrhoid creams  Discussed importance of lifestyle modifications/behavioral changes for symptoms-high fiber diet, hydration, consideration of squatty potty, avoid wiping too hard    She is interested in colonoscopy, referral placed today  She states that insurance would cover this  Relevant Orders    Ambulatory referral for colonoscopy       Endocrine    Type 1 diabetes mellitus with hyperglycemia (HCC)    Relevant Medications    Tresiba FlexTouch 200 units/mL CONCENTRATED U-200 injection pen       Other    Sicca syndrome with keratoconjunctivitis ([facility] [address] )     Ultimately testing was negative for Sjogren's including lip biopsy  She still struggles with dry left eye, eye irritation   Treats with drops           Relevant Medications    Flarex 0 1 % ophthalmic suspension    Dyspepsia     Symptoms of throat clearing possibly acid reflux, recommended trial of Pepcid             Other Visit Diagnoses     Annual physical exam    -  Primary    Screening for colon cancer        Relevant Orders    Ambulatory referral for colonoscopy           Immunizations and preventive care screenings were discussed with patient today  Appropriate education was printed on patient's after visit summary  Counseling:  Alcohol/drug use: discussed moderation in alcohol intake, the recommendations for healthy alcohol use, and avoidance of illicit drug use  Dental Health: discussed importance of regular tooth brushing, flossing, and dental visits  Injury prevention: discussed safety/seat belts, safety helmets, smoke detectors, carbon dioxide detectors, and smoking near bedding or upholstery  Exercise: the importance of regular exercise/physical activity was discussed  Recommend exercise 3-5 times per week for at least 30 minutes  Follow up next physical in 1 year  Subjective:    Nichol Portillo is a 46 y o  female and is here for a comprehensive physical exam      Acute Complaints:    She notices throat clearing, worse after eating  She denies other symptoms of reflux  She also has occasional BRBPR only with wiping  She did have this this past week on Monday and Tuesday  She denies pain with bowel movements  Her bowel habits are otherwise normal      Diet and Physical Activity  Diet/Nutrition: does follow a well balanced diet  Exercise: no formal exercise  General Health  Vision: no vision problems and goes for regular eye exams  Dental: regular dental visits  Gyn Health:    OB History        5    Para   3    Term   2            AB   2    Living   2       SAB        IAB        Ectopic        Multiple        Live Births   2                  Patient's last menstrual period was 2022 (exact date)    Follows GYN  Up to date on PAP  S/p tubal for contraception      Histories Updated and Reviewed 2022:  Patient's Medications   New Prescriptions    No medications on file   Previous Medications    ACCU-CHEK FASTCLIX LANCETS MISC    ACCU-CHEK FASTCLIX 6time daily EE10 65    BD PEN NEEDLE LEANDRA U/F 32G X 4 MM MISC        ERYTHROMYCIN (ILOTYCIN) OPHTHALMIC OINTMENT APPLY 1 APPLICATION IN BOTH EYES AT BEDTIME    FLAREX 0 1 % OPHTHALMIC SUSPENSION        FLUTICASONE (FLONASE) 50 MCG/ACT NASAL SPRAY    1 spray into each nostril daily    GLUCAGON (GLUCAGON EMERGENCY) 1 MG INJECTION    1 mg as needed for low blood sugar May repeat dose  every 20 minutes as needed    GLUCOSE BLOOD (ACCU-CHEK MUKESH PLUS) TEST STRIP    1 each by Other route as needed (hypoglycemia) Use as instructed    INSULIN ASPART (NOVOLOG) 100 UNITS/ML INJECTION    Inject under the skin Sliding scale    INSULIN ASPART, W/NIACINAMIDE, (FIASP) 100 UNITS/ML INJECTION PEN    TAKE ICR 8 AND ISF 30 3 TIMES A DAY BEFORE MEALS TDD 40 UNITS  E10 9    INSULIN DEGLUDEC 100 UNIT/ML SOLN    Inject under the skin    LOTEMAX SM 0 38 % GEL    as needed     RESTASIS 0 05 % OPHTHALMIC EMULSION    as needed     TRESIBA FLEXTOUCH 200 UNITS/ML CONCENTRATED U-200 INJECTION PEN        VALACYCLOVIR (VALTREX) 1,000 MG TABLET        ZIRGAN 0 15 % GEL    as needed    Modified Medications    No medications on file   Discontinued Medications    SUMATRIPTAN (IMITREX) 50 MG TABLET    Take 1 tablet (50 mg total) by mouth once as needed for migraine for up to 1 dose     No Known Allergies  Past Medical History:   Diagnosis Date    Anorexia nervosa     Breast cyst     Diabetes mellitus ([facility] [address] )     Fibroids     Gestational diabetes     Papanicolaou smear for cervical cancer screening 02/2018    neg    Submucous leiomyoma of uterus      Social History     Socioeconomic History    Marital status: /Civil Union     Spouse name: Not on file    Number of children: Not on file    Years of education: Not on file    Highest education level: Not on file   Occupational History    Not on file   Tobacco Use    Smoking status: Former Smoker     Quit date: 1/1/2012     Years since quitting: 10 4    Smokeless tobacco: Never Used   Vaping Use    Vaping Use: Never used   Substance and Sexual Activity    Alcohol use: Yes     Comment: social    Drug use: No    Sexual activity: Yes     Partners: Male     Birth control/protection: Female Sterilization   Other Topics Concern    Not on file   Social History Narrative    Not on file     Social Determinants of Health     Financial Resource Strain: Not on file   Food Insecurity: Not on file   Transportation Needs: Not on file   Physical Activity: Not on file   Stress: Not on file   Social Connections: Not on file   Intimate Partner Violence: Not on file   Housing Stability: Not on file     Immunization History   Administered Date(s) Administered    COVID-19 MODERNA VACC 0 5 ML IM 01/08/2021, 02/03/2021    Fluzone Split Quad 0 5 mL 11/12/2015, 10/17/2018    INFLUENZA 03/12/2014, 01/14/2015, 11/12/2015, 10/17/2018, 09/30/2019, 10/15/2020    Influenza Quadrivalent Preservative Free 3 years and older IM 10/12/2015, 09/18/2019    Influenza, injectable, quadrivalent, preservative free 0 5 mL 10/27/2021    Pneumococcal Polysaccharide PPV23 04/22/2019    Td (adult), adsorbed 10/28/2015    Tdap 03/06/2014       Objective:  /80 (BP Location: Left arm, Patient Position: Sitting, Cuff Size: Standard)   Pulse 80   Temp (!) 96 2 °F (35 7 °C)   Resp 18   Ht 5' 5" (1 651 m)   Wt 65 8 kg (145 lb)   LMP 05/13/2022 (Exact Date)   SpO2 98%   Breastfeeding No   BMI 24 13 kg/m²   BP Readings from Last 3 Encounters:   05/25/22 120/80   10/27/21 118/70   09/01/21 124/80      Wt Readings from Last 3 Encounters:   05/25/22 65 8 kg (145 lb)   05/11/22 63 5 kg (140 lb)   10/27/21 66 9 kg (147 lb 8 oz)      Physical Exam  Constitutional:       General: She is not in acute distress  Appearance: Normal appearance  She is normal weight  She is not ill-appearing or toxic-appearing  HENT:      Head: Normocephalic and atraumatic        Right Ear: External ear normal       Left Ear: External ear normal       Nose: Nose normal       Mouth/Throat:      Mouth: Mucous membranes are moist    Eyes:      General: Right eye: No discharge  Left eye: No discharge  Extraocular Movements: Extraocular movements intact  Conjunctiva/sclera: Conjunctivae normal       Comments: Left eye lid swollen and erythematous   Cardiovascular:      Rate and Rhythm: Normal rate and regular rhythm  Pulses: Normal pulses  Heart sounds: Normal heart sounds  No murmur heard  No friction rub  No gallop  Pulmonary:      Effort: Pulmonary effort is normal  No respiratory distress  Breath sounds: Normal breath sounds  No stridor  No wheezing, rhonchi or rales  Abdominal:      General: Bowel sounds are normal  There is no distension  Palpations: Abdomen is soft  There is no mass  Tenderness: There is no abdominal tenderness  There is no guarding or rebound  Hernia: No hernia is present  Musculoskeletal:         General: Normal range of motion  Cervical back: Normal range of motion and neck supple  No rigidity  Right lower leg: No edema  Left lower leg: No edema  Lymphadenopathy:      Cervical: No cervical adenopathy  Skin:     General: Skin is warm and dry  Findings: No erythema or rash  Neurological:      General: No focal deficit present  Mental Status: She is alert and oriented to person, place, and time  Psychiatric:         Mood and Affect: Mood normal          Behavior: Behavior normal          Patient Care Team:  Geeta Cruz MD as PCP - General (Family Medicine)  MD Karli Miller Maya Brain, MD Philomena Mention, MD Kathren Rives, MD Vivienne Hammonds, MD (Obstetrics and Gynecology)    Geeta Cruz MD    Note: Portions of the record may have been created with voice recognition software  Occasional wrong word or "sound a like" substitutions may have occurred due to the inherent limitations of voice recognition software  Read the chart carefully and recognize, using context, where substitutions have occurred

## 2022-05-25 ENCOUNTER — OFFICE VISIT (OUTPATIENT)
Dept: FAMILY MEDICINE CLINIC | Facility: CLINIC | Age: 42
End: 2022-05-25
Payer: COMMERCIAL

## 2022-05-25 ENCOUNTER — APPOINTMENT (OUTPATIENT)
Dept: LAB | Facility: CLINIC | Age: 42
End: 2022-05-25
Payer: COMMERCIAL

## 2022-05-25 VITALS
HEART RATE: 80 BPM | DIASTOLIC BLOOD PRESSURE: 80 MMHG | RESPIRATION RATE: 18 BRPM | WEIGHT: 145 LBS | SYSTOLIC BLOOD PRESSURE: 120 MMHG | OXYGEN SATURATION: 98 % | HEIGHT: 65 IN | TEMPERATURE: 96.2 F | BODY MASS INDEX: 24.16 KG/M2

## 2022-05-25 DIAGNOSIS — M35.01 SICCA SYNDROME WITH KERATOCONJUNCTIVITIS (HCC): ICD-10-CM

## 2022-05-25 DIAGNOSIS — E10.65 TYPE 1 DIABETES MELLITUS WITH HYPERGLYCEMIA (HCC): ICD-10-CM

## 2022-05-25 DIAGNOSIS — Z12.11 SCREENING FOR COLON CANCER: ICD-10-CM

## 2022-05-25 DIAGNOSIS — K62.5 BRBPR (BRIGHT RED BLOOD PER RECTUM): ICD-10-CM

## 2022-05-25 DIAGNOSIS — Z00.00 ANNUAL PHYSICAL EXAM: Primary | ICD-10-CM

## 2022-05-25 DIAGNOSIS — E10.9 TYPE 1 DIABETES MELLITUS WITHOUT COMPLICATION (HCC): ICD-10-CM

## 2022-05-25 DIAGNOSIS — R10.13 DYSPEPSIA: ICD-10-CM

## 2022-05-25 PROBLEM — H16.229 SICCA SYNDROME WITH KERATOCONJUNCTIVITIS: Status: ACTIVE | Noted: 2022-05-25

## 2022-05-25 LAB
ANION GAP SERPL CALCULATED.3IONS-SCNC: 7 MMOL/L (ref 4–13)
BUN SERPL-MCNC: 10 MG/DL (ref 5–25)
CALCIUM SERPL-MCNC: 8.5 MG/DL (ref 8.4–10.2)
CHLORIDE SERPL-SCNC: 103 MMOL/L (ref 96–108)
CHOLEST SERPL-MCNC: 166 MG/DL
CO2 SERPL-SCNC: 27 MMOL/L (ref 21–32)
CREAT SERPL-MCNC: 0.5 MG/DL (ref 0.6–1.3)
EST. AVERAGE GLUCOSE BLD GHB EST-MCNC: 243 MG/DL
GFR SERPL CREATININE-BSD FRML MDRD: 122 ML/MIN/1.73SQ M
GLUCOSE P FAST SERPL-MCNC: 227 MG/DL (ref 65–99)
HBA1C MFR BLD: 10.1 %
HDLC SERPL-MCNC: 77 MG/DL
LDLC SERPL CALC-MCNC: 73 MG/DL (ref 0–100)
NONHDLC SERPL-MCNC: 89 MG/DL
POTASSIUM SERPL-SCNC: 3.9 MMOL/L (ref 3.5–5.3)
SODIUM SERPL-SCNC: 137 MMOL/L (ref 135–147)
TRIGL SERPL-MCNC: 80 MG/DL

## 2022-05-25 PROCEDURE — 3008F BODY MASS INDEX DOCD: CPT | Performed by: FAMILY MEDICINE

## 2022-05-25 PROCEDURE — 36415 COLL VENOUS BLD VENIPUNCTURE: CPT

## 2022-05-25 PROCEDURE — 1036F TOBACCO NON-USER: CPT | Performed by: FAMILY MEDICINE

## 2022-05-25 PROCEDURE — 83036 HEMOGLOBIN GLYCOSYLATED A1C: CPT

## 2022-05-25 PROCEDURE — 3046F HEMOGLOBIN A1C LEVEL >9.0%: CPT | Performed by: FAMILY MEDICINE

## 2022-05-25 PROCEDURE — 80061 LIPID PANEL: CPT

## 2022-05-25 PROCEDURE — 80048 BASIC METABOLIC PNL TOTAL CA: CPT

## 2022-05-25 PROCEDURE — 99395 PREV VISIT EST AGE 18-39: CPT | Performed by: FAMILY MEDICINE

## 2022-05-25 PROCEDURE — 3725F SCREEN DEPRESSION PERFORMED: CPT | Performed by: FAMILY MEDICINE

## 2022-05-25 RX ORDER — FLUOROMETHOLONE ACETATE 1 MG/ML
SUSPENSION/ DROPS OPHTHALMIC
COMMUNITY
Start: 2022-05-18

## 2022-05-25 NOTE — ASSESSMENT & PLAN NOTE
Lab Results   Component Value Date    HGBA1C 10 1 (H) 05/25/2022    HGBA1C 10 4 (H) 01/05/2022    HGBA1C 9 5 09/08/2021     Lab Results   Component Value Date    GLUF 227 (H) 05/25/2022    LDLCALC 73 05/25/2022    CREATININE 0 50 (L) 05/25/2022     Diagnosed following first pregnancy   Not optimally controlled on current regimen, counseled on lifestyle changes  She is considering insulin pump

## 2022-05-25 NOTE — ASSESSMENT & PLAN NOTE
Ultimately testing was negative for Sjogren's including lip biopsy  She still struggles with dry left eye, eye irritation   Treats with drops

## 2022-05-25 NOTE — ASSESSMENT & PLAN NOTE
Patient with episodes of BRBPR, likely hemorrhoidal bleeding given that it only occurs with wiping  Recommended OTC hemorrhoid creams  Discussed importance of lifestyle modifications/behavioral changes for symptoms-high fiber diet, hydration, consideration of squatty potty, avoid wiping too hard    She is interested in colonoscopy, referral placed today  She states that insurance would cover this

## 2022-07-06 ENCOUNTER — TELEMEDICINE (OUTPATIENT)
Dept: FAMILY MEDICINE CLINIC | Facility: CLINIC | Age: 42
End: 2022-07-06
Payer: COMMERCIAL

## 2022-07-06 DIAGNOSIS — U07.1 COVID-19: Primary | ICD-10-CM

## 2022-07-06 PROCEDURE — 99213 OFFICE O/P EST LOW 20 MIN: CPT | Performed by: FAMILY MEDICINE

## 2022-07-06 NOTE — PROGRESS NOTES
COVID-19 Outpatient Progress Note    Assessment/Plan:    Problem List Items Addressed This Visit    None     Visit Diagnoses     COVID-19    -  Primary         Disposition:     Patient has COVID-19 infection  Based off CDC guidelines, they were recommended to isolate for 5 days from the date of the positive test  If they remain asymptomatic, isolation may be ended followed by 5 days of wearing a mask when around othes to minimize risk of infecting others  If they have a fever, continue to stay home until fever resolves for at least 24 hours  Discussed symptom directed medication options with patient  I have spent 12 minutes directly with the patient  Greater than 50% of this time was spent in counseling/coordination of care regarding: risks and benefits of treatment options, patient and family education, importance of treatment compliance and impressions  Encounter provider Ella Balderrama MD    Provider located at [place] [address]  [phone]    Recent Visits  No visits were found meeting these conditions  Showing recent visits within past 7 days and meeting all other requirements  Today's Visits  Date Type Provider Dept   07/06/22 [address] [place], [place] [address]MD Keshawn   Showing today's visits and meeting all other requirements  Future Appointments  No visits were found meeting these conditions  Showing future appointments within next 150 days and meeting all other requirements     This virtual check-in was done via [employer] and patient was informed that this is a secure, HIPAA-compliant platform  She agrees to proceed  Patient agrees to participate in a virtual check in via telephone or video visit instead of presenting to the office to address urgent/immediate medical needs  Patient is aware this is a billable service  After connecting through [facility], the patient was identified by name and date of birth  Willie Crocker was informed that this was a telemedicine visit and that the exam was being conducted confidentially over secure lines  My office door was closed  No one else was in the room  Willie Crocker acknowledged consent and understanding of privacy and security of the telemedicine visit  I informed the patient that I have reviewed her record in Epic and presented the opportunity for her to ask any questions regarding the visit today  The patient agreed to participate  Verification of patient location:  Patient is located in the following state in which I hold an active license: [place]    Subjective:   Willie Crocker is a 38 y o  female who has been screened for COVID-19  Symptom change since last report: improving  Patient's symptoms include fever, chills, fatigue, nasal congestion, rhinorrhea, cough and myalgias  Patient denies malaise, sore throat, anosmia, loss of taste, shortness of breath, chest tightness, abdominal pain, nausea, vomiting, diarrhea and headaches  - Date of symptom onset: 7/4/2022  - Date of positive COVID-19 test: 7/6/2022  Type of test: Home antigen  Home antigen result verified by provider  Will get picture of test uploaded/scanned into patient's chart  COVID-19 vaccination status: Fully vaccinated (primary series)    Sheryle Chambers has been staying home and has isolated themselves in her home  She just got back from vacation and was feeling unwell with fatigue, she felt worse yesterday, had some low grade fevers so she took COVID test  She has not have fever/felt feverish since this morning  She is overall feeling better today       Lab Results   Component Value Date    SARSCOV2 Not Detected 09/29/2020     Past Medical History:   Diagnosis Date    Anorexia nervosa     Breast cyst     Diabetes mellitus ([facility] [address] )     Fibroids     Gestational diabetes     Papanicolaou smear for cervical cancer screening 02/2018    neg    Submucous leiomyoma of uterus      Past Surgical History:   Procedure Laterality Date     SECTION      INDUCED       surgically, x2    TUBAL LIGATION       Current Outpatient Medications   Medication Sig Dispense Refill    ACCU-CHEK FASTCLIX LANCETS MISC ACCU-CHEK FASTCLIX 6time daily EE10 65      BD PEN NEEDLE LEANDRA U/F 32G X 4 MM MISC       erythromycin (ILOTYCIN) ophthalmic ointment APPLY 1 APPLICATION IN BOTH EYES AT BEDTIME (Patient not taking: Reported on 2022)      Flarex 0 1 % ophthalmic suspension       fluticasone (FLONASE) 50 mcg/act nasal spray 1 spray into each nostril daily 1 Bottle 0    glucagon (GLUCAGON EMERGENCY) 1 MG injection 1 mg as needed for low blood sugar May repeat dose  every 20 minutes as needed      glucose blood (ACCU-CHEK MUKESH PLUS) test strip 1 each by Other route as needed (hypoglycemia) Use as instructed 100 each 0    insulin aspart (NovoLOG) 100 units/mL injection Inject under the skin Sliding scale      insulin aspart, w/niacinamide, (FIASP) 100 Units/mL injection pen TAKE ICR 8 AND ISF 30 3 TIMES A DAY BEFORE MEALS TDD 40 UNITS  E10 9      Insulin Degludec 100 UNIT/ML SOLN Inject under the skin      Lotemax SM 0 38 % GEL as needed  (Patient not taking: Reported on 2022)      RESTASIS 0 05 % ophthalmic emulsion as needed  (Patient not taking: Reported on 2022)      Blossomshea Mcgrathann FlexTouch 200 units/mL CONCENTRATED U-200 injection pen       valACYclovir (VALTREX) 1,000 mg tablet       Zirgan 0 15 % GEL as needed  (Patient not taking: Reported on 2022)       No current facility-administered medications for this visit  No Known Allergies    Review of Systems   Constitutional: Positive for chills, fatigue and fever  HENT: Positive for congestion and rhinorrhea  Negative for sore throat  Respiratory: Positive for cough  Negative for chest tightness and shortness of breath  Gastrointestinal: Negative for abdominal pain, diarrhea, nausea and vomiting  Musculoskeletal: Positive for myalgias  Neurological: Negative for headaches  Objective: There were no vitals filed for this visit  Physical Exam  Constitutional:       General: She is not in acute distress  Appearance: Normal appearance  She is not ill-appearing or toxic-appearing  HENT:      Nose: Nose normal  No congestion  Mouth/Throat:      Mouth: Mucous membranes are moist    Eyes:      Extraocular Movements: Extraocular movements intact  Conjunctiva/sclera: Conjunctivae normal    Pulmonary:      Effort: Pulmonary effort is normal  No respiratory distress  Comments: Able to speak in complete sentences without difficulty   Musculoskeletal:      Cervical back: Normal range of motion  Skin:     Coloration: Skin is not pale  Findings: No erythema  Neurological:      Mental Status: She is alert  VIRTUAL VISIT [facility] verbally agrees to participate in [employer]  Pt is aware that [employer] could be limited without vital signs or the ability to perform a full hands-on physical Parvin Nunica understands she or the provider may request at any time to terminate the video visit and request the patient to seek care or treatment in person

## 2022-09-21 ENCOUNTER — ANNUAL EXAM (OUTPATIENT)
Dept: GYNECOLOGY | Facility: CLINIC | Age: 42
End: 2022-09-21
Payer: COMMERCIAL

## 2022-09-21 VITALS
DIASTOLIC BLOOD PRESSURE: 90 MMHG | HEIGHT: 64 IN | WEIGHT: 144.2 LBS | BODY MASS INDEX: 24.62 KG/M2 | SYSTOLIC BLOOD PRESSURE: 130 MMHG

## 2022-09-21 DIAGNOSIS — Z80.3 FAMILY HISTORY OF BREAST CANCER: ICD-10-CM

## 2022-09-21 DIAGNOSIS — E10.9 TYPE 1 DIABETES MELLITUS WITHOUT COMPLICATION (HCC): ICD-10-CM

## 2022-09-21 DIAGNOSIS — Z01.419 ENCOUNTER FOR WELL WOMAN EXAM: Primary | ICD-10-CM

## 2022-09-21 DIAGNOSIS — Z12.39 ENCOUNTER FOR SCREENING BREAST EXAMINATION: ICD-10-CM

## 2022-09-21 PROCEDURE — S0612 ANNUAL GYNECOLOGICAL EXAMINA: HCPCS | Performed by: PHYSICIAN ASSISTANT

## 2022-09-23 NOTE — PROGRESS NOTES
Assessment/Plan:    No problem-specific Assessment & Plan notes found for this encounter  Diagnoses and all orders for this visit:    Encounter for well woman exam    Encounter for screening breast examination    Type 1 diabetes mellitus without complication ([address] )    Family history of breast cancer          Subjective:      Patient ID: Rita Amaya is a 38 y o  female  Pt presents for her annual exam today--  She has no complaints  She has regular bleeding   no pelvic pain  Bowel and bladder are regular  No breast concerns today  Last mammo--2021  Family h/o breast ca      No pap today  rx mammo  Daily mvi      The following portions of the patient's history were reviewed and updated as appropriate: allergies, current medications, past family history, past medical history, past social history, past surgical history and problem list     Review of Systems   Constitutional: Negative for chills, fever and unexpected weight change  Gastrointestinal: Negative for abdominal pain, blood in stool, constipation and diarrhea  Genitourinary: Negative  Objective:      /90   Ht 5' 4" (1 626 m)   Wt 65 4 kg (144 lb 3 2 oz)   LMP 09/10/2022 (Exact Date)   BMI 24 75 kg/m²          Physical Exam  Vitals and nursing note reviewed  Constitutional:       Appearance: She is well-developed  HENT:      Head: Normocephalic and atraumatic  Chest:   Breasts:      Right: No inverted nipple, mass, nipple discharge or skin change  Left: No inverted nipple, mass, nipple discharge or skin change  Abdominal:      Palpations: Abdomen is soft  Genitourinary:     Exam position: Supine  Labia:         Right: No rash, tenderness or lesion  Left: No rash, tenderness or lesion  Vagina: Normal       Cervix: No cervical motion tenderness, discharge or friability  Adnexa:         Right: No mass, tenderness or fullness  Left: No mass, tenderness or fullness  Musculoskeletal:      Cervical back: Normal range of motion  Lymphadenopathy:      Lower Body: No right inguinal adenopathy  No left inguinal adenopathy

## 2022-10-05 ENCOUNTER — HOSPITAL ENCOUNTER (OUTPATIENT)
Dept: RADIOLOGY | Facility: HOSPITAL | Age: 42
Discharge: HOME | End: 2022-10-05
Payer: COMMERCIAL

## 2022-10-05 VITALS — HEIGHT: 64 IN | BODY MASS INDEX: 23.9 KG/M2 | WEIGHT: 140 LBS

## 2022-10-05 DIAGNOSIS — R92.8 ABNORMAL MAMMOGRAM: ICD-10-CM

## 2022-10-05 PROCEDURE — 77066 DX MAMMO INCL CAD BI: CPT

## 2022-10-05 PROCEDURE — G0279 TOMOSYNTHESIS, MAMMO: HCPCS

## 2022-10-05 PROCEDURE — 76642 ULTRASOUND BREAST LIMITED: CPT

## 2022-10-11 PROBLEM — M35.01 SICCA SYNDROME WITH KERATOCONJUNCTIVITIS (HCC): Status: RESOLVED | Noted: 2022-05-25 | Resolved: 2022-10-11

## 2022-10-11 PROBLEM — H16.229 SICCA SYNDROME WITH KERATOCONJUNCTIVITIS: Status: RESOLVED | Noted: 2022-05-25 | Resolved: 2022-10-11

## 2022-10-12 PROBLEM — H16.202 KERATOCONJUNCTIVITIS OF LEFT EYE: Status: RESOLVED | Noted: 2021-10-27 | Resolved: 2022-10-12

## 2022-11-16 ENCOUNTER — APPOINTMENT (OUTPATIENT)
Dept: LAB | Facility: CLINIC | Age: 42
End: 2022-11-16

## 2022-11-16 DIAGNOSIS — E55.9 VITAMIN D DEFICIENCY: ICD-10-CM

## 2022-11-16 LAB — 25(OH)D3 SERPL-MCNC: 24.9 NG/ML (ref 30–100)

## 2023-02-01 ENCOUNTER — TELEPHONE (OUTPATIENT)
Dept: GASTROENTEROLOGY | Facility: CLINIC | Age: 43
End: 2023-02-01

## 2023-02-01 ENCOUNTER — OFFICE VISIT (OUTPATIENT)
Dept: GASTROENTEROLOGY | Facility: CLINIC | Age: 43
End: 2023-02-01

## 2023-02-01 VITALS
HEIGHT: 64 IN | WEIGHT: 143 LBS | DIASTOLIC BLOOD PRESSURE: 94 MMHG | SYSTOLIC BLOOD PRESSURE: 152 MMHG | BODY MASS INDEX: 24.41 KG/M2 | HEART RATE: 95 BPM

## 2023-02-01 DIAGNOSIS — K62.5 BRBPR (BRIGHT RED BLOOD PER RECTUM): Primary | ICD-10-CM

## 2023-02-01 NOTE — TELEPHONE ENCOUNTER
Scheduled date of colonoscopy (as of today):  Physician performing colonoscopy:  Location of colonoscopy:   Bowel prep reviewed with patient:  Instructions reviewed with patient by:  Clearances:

## 2023-02-01 NOTE — TELEPHONE ENCOUNTER
Scheduled date of colonoscopy (as of today):3/1/23  Physician performing colonoscopy: Babita  Location of colonoscopy:[facility]  Bowel prep reviewed with patient: Sabas/ Remi  Instructions reviewed with patient by:Dina REYNA  Clearances: None

## 2023-02-01 NOTE — PROGRESS NOTES
[facility] - Outpatient Consultation  Jono Moody 38 y o  female MRN: [id number]  Encounter: [id number]          ASSESSMENT AND PLAN:      1  BRBPR (bright red blood per rectum)  Patient with bright red blood per rectum which is likely outlet pathology and we will plan for colonoscopy for further evaluation, next prep has been given as well as instructions for diabetic patients, prep and procedure were explained to the patient  - CBC and differential; Future  - Colonoscopy; Future    ______________________________________________________________________    HPI:    This is a 60-year-old female who presents for consultation for colonoscopy due to rectal bleeding  Reports that the bleeding is sporadic and does not appear to be with straining and reports that she had no significant change in bowel habits with constipation or diarrhea  She is type I diabetic which was diagnosed after her pregnancies  Denies any abdominal pain or upper GI symptoms  Denies any family history of colorectal cancer  Was evaluated for autoimmune disease and had testing for celiac last year which was negative  Patient does have dry eyes as well as dry mouth and she reports that work-up was negative for Sjogren's  REVIEW OF SYSTEMS:    CONSTITUTIONAL: Denies any fever, chills, rigors, and weight loss  HEENT: No earache or tinnitus  Denies hearing loss or visual disturbances  CARDIOVASCULAR: No chest pain or palpitations  RESPIRATORY: Denies any cough, hemoptysis, shortness of breath or dyspnea on exertion  GASTROINTESTINAL: As noted in the History of Present Illness  GENITOURINARY: No problems with urination  Denies any hematuria or dysuria  NEUROLOGIC: No dizziness or vertigo, denies headaches  MUSCULOSKELETAL: Denies any muscle or joint pain  SKIN: Denies skin rashes or itching  ENDOCRINE: Denies excessive thirst  Denies intolerance to heat or cold    PSYCHOSOCIAL: Denies depression or anxiety  Denies any recent memory loss         Historical Information   Past Medical History:   Diagnosis Date   • Anorexia nervosa    • Breast cyst    • Diabetes mellitus ([facility] [address] )    • Fibroids    • Gestational diabetes    • Papanicolaou smear for cervical cancer screening 2018    neg   • Submucous leiomyoma of uterus      Past Surgical History:   Procedure Laterality Date   •  SECTION     • INDUCED       surgically, x2   • TUBAL LIGATION       Social History   Social History     Substance and Sexual Activity   Alcohol Use Yes    Comment: social     Social History     Substance and Sexual Activity   Drug Use No     Social History     Tobacco Use   Smoking Status Former   • Types: Cigarettes   • Quit date: 2012   • Years since quittin 0   Smokeless Tobacco Never     Family History   Problem Relation Age of Onset   • Hepatitis Mother         acute hepatitis B virus   • Other Mother         meningioma   • Hypertension Father    • Breast cancer Maternal Grandmother 79   • Breast cancer Maternal Aunt 60   • BRCA1 Negative Maternal Aunt    • BRCA2 Negative Maternal Aunt    • Breast cancer Paternal Aunt 39       Meds/Allergies       Current Outpatient Medications:   •  ACCU-CHEK FASTCLIX LANCETS MISC  •  BD PEN NEEDLE LEANDRA U/F 32G X 4 MM MISC  •  Flarex 0 1 % ophthalmic suspension  •  glucose blood (ACCU-CHEK MUKESH PLUS) test strip  •  insulin aspart (NovoLOG) 100 units/mL injection  •  Tresiba FlexTouch 200 units/mL CONCENTRATED U-200 injection pen  •  valACYclovir (VALTREX) 1,000 mg tablet  •  erythromycin (ILOTYCIN) ophthalmic ointment  •  fluticasone (FLONASE) 50 mcg/act nasal spray  •  glucagon (GLUCAGON EMERGENCY) 1 MG injection  •  insulin aspart, w/niacinamide, (FIASP) 100 Units/mL injection pen  •  Insulin Degludec 100 UNIT/ML SOLN  •  Lotemax SM 0 38 % GEL  •  RESTASIS 0 05 % ophthalmic emulsion  •  Zirgan 0 15 % GEL    No Known Allergies        Objective     Blood pressure 152/94, pulse 95, height 5' 4" (1 626 m), weight 64 9 kg (143 lb), not currently breastfeeding  Body mass index is 24 55 kg/m²  PHYSICAL EXAM:      General Appearance:   Alert, cooperative, no distress   HEENT:   Normocephalic, atraumatic, anicteric      Neck:  Supple, symmetrical, trachea midline   Lungs:   Clear to auscultation bilaterally; no rales, rhonchi or wheezing; respirations unlabored    Heart[de-identified]   Regular rate and rhythm; no murmur, rub, or gallop  Abdomen:   Soft, non-tender, non-distended; normal bowel sounds; no masses, no organomegaly    Genitalia:   Deferred    Rectal:   Deferred    Extremities:  No cyanosis, clubbing or edema    Pulses:  2+ and symmetric    Skin:  No jaundice, rashes, or lesions    Lymph nodes:  No palpable cervical lymphadenopathy        Lab Results:   No visits with results within 1 Day(s) from this visit  Latest known visit with results is:   Appointment on 11/16/2022   Component Date Value   • Vit D, 25-Hydroxy 11/16/2022 24 9 (L)          Radiology Results:   No results found

## 2023-03-15 ENCOUNTER — TELEPHONE (OUTPATIENT)
Dept: GASTROENTEROLOGY | Facility: CLINIC | Age: 43
End: 2023-03-15

## 2023-03-15 ENCOUNTER — APPOINTMENT (OUTPATIENT)
Dept: LAB | Facility: CLINIC | Age: 43
End: 2023-03-15

## 2023-03-15 DIAGNOSIS — K62.5 BRBPR (BRIGHT RED BLOOD PER RECTUM): ICD-10-CM

## 2023-03-15 DIAGNOSIS — E10.69 TYPE 1 DIABETES MELLITUS WITH OTHER SPECIFIED COMPLICATION (HCC): ICD-10-CM

## 2023-03-15 LAB
ANION GAP SERPL CALCULATED.3IONS-SCNC: 6 MMOL/L (ref 4–13)
BASOPHILS # BLD AUTO: 0.06 THOUSANDS/ÂΜL (ref 0–0.1)
BASOPHILS NFR BLD AUTO: 1 % (ref 0–1)
BUN SERPL-MCNC: 13 MG/DL (ref 5–25)
CALCIUM SERPL-MCNC: 8.9 MG/DL (ref 8.4–10.2)
CHLORIDE SERPL-SCNC: 104 MMOL/L (ref 96–108)
CO2 SERPL-SCNC: 26 MMOL/L (ref 21–32)
CREAT SERPL-MCNC: 0.54 MG/DL (ref 0.6–1.3)
EOSINOPHIL # BLD AUTO: 0.11 THOUSAND/ÂΜL (ref 0–0.61)
EOSINOPHIL NFR BLD AUTO: 2 % (ref 0–6)
ERYTHROCYTE [DISTWIDTH] IN BLOOD BY AUTOMATED COUNT: 11.9 % (ref 11.6–15.1)
EST. AVERAGE GLUCOSE BLD GHB EST-MCNC: 255 MG/DL
GFR SERPL CREATININE-BSD FRML MDRD: 118 ML/MIN/1.73SQ M
GLUCOSE P FAST SERPL-MCNC: 245 MG/DL (ref 65–99)
HBA1C MFR BLD: 10.5 %
HCT VFR BLD AUTO: 40 % (ref 34.8–46.1)
HGB BLD-MCNC: 12.9 G/DL (ref 11.5–15.4)
IMM GRANULOCYTES # BLD AUTO: 0.01 THOUSAND/UL (ref 0–0.2)
IMM GRANULOCYTES NFR BLD AUTO: 0 % (ref 0–2)
LYMPHOCYTES # BLD AUTO: 1.67 THOUSANDS/ÂΜL (ref 0.6–4.47)
LYMPHOCYTES NFR BLD AUTO: 34 % (ref 14–44)
MCH RBC QN AUTO: 30.4 PG (ref 26.8–34.3)
MCHC RBC AUTO-ENTMCNC: 32.3 G/DL (ref 31.4–37.4)
MCV RBC AUTO: 94 FL (ref 82–98)
MONOCYTES # BLD AUTO: 0.32 THOUSAND/ÂΜL (ref 0.17–1.22)
MONOCYTES NFR BLD AUTO: 7 % (ref 4–12)
NEUTROPHILS # BLD AUTO: 2.68 THOUSANDS/ÂΜL (ref 1.85–7.62)
NEUTS SEG NFR BLD AUTO: 56 % (ref 43–75)
NRBC BLD AUTO-RTO: 0 /100 WBCS
PLATELET # BLD AUTO: 225 THOUSANDS/UL (ref 149–390)
PMV BLD AUTO: 14 FL (ref 8.9–12.7)
POTASSIUM SERPL-SCNC: 3.9 MMOL/L (ref 3.5–5.3)
RBC # BLD AUTO: 4.25 MILLION/UL (ref 3.81–5.12)
SODIUM SERPL-SCNC: 136 MMOL/L (ref 135–147)
WBC # BLD AUTO: 4.85 THOUSAND/UL (ref 4.31–10.16)

## 2023-03-15 NOTE — TELEPHONE ENCOUNTER
----- Message from Marylene Arthurs sent at 3/15/2023  9:19 AM EDT -----  Regarding: Please move to the hospital  As per anesthesia please move patient to the hospital

## 2023-04-26 ENCOUNTER — ANESTHESIA EVENT (OUTPATIENT)
Dept: GASTROENTEROLOGY | Facility: HOSPITAL | Age: 43
End: 2023-04-26

## 2023-04-26 ENCOUNTER — RA CDI HCC (OUTPATIENT)
Dept: OTHER | Facility: HOSPITAL | Age: 43
End: 2023-04-26

## 2023-04-26 ENCOUNTER — ANESTHESIA (OUTPATIENT)
Dept: GASTROENTEROLOGY | Facility: HOSPITAL | Age: 43
End: 2023-04-26

## 2023-04-26 ENCOUNTER — HOSPITAL ENCOUNTER (OUTPATIENT)
Dept: GASTROENTEROLOGY | Facility: HOSPITAL | Age: 43
Setting detail: OUTPATIENT SURGERY
Discharge: HOME | End: 2023-04-26

## 2023-04-26 VITALS
DIASTOLIC BLOOD PRESSURE: 78 MMHG | TEMPERATURE: 97.5 F | WEIGHT: 143 LBS | RESPIRATION RATE: 16 BRPM | OXYGEN SATURATION: 100 % | HEART RATE: 70 BPM | SYSTOLIC BLOOD PRESSURE: 123 MMHG | BODY MASS INDEX: 24.41 KG/M2 | HEIGHT: 64 IN

## 2023-04-26 DIAGNOSIS — K62.5 BRBPR (BRIGHT RED BLOOD PER RECTUM): ICD-10-CM

## 2023-04-26 LAB
EXT PREGNANCY TEST URINE: NEGATIVE
EXT. CONTROL: NORMAL
GLUCOSE SERPL-MCNC: 116 MG/DL (ref 65–140)

## 2023-04-26 RX ORDER — SODIUM CHLORIDE, SODIUM LACTATE, POTASSIUM CHLORIDE, CALCIUM CHLORIDE 600; 310; 30; 20 MG/100ML; MG/100ML; MG/100ML; MG/100ML
INJECTION, SOLUTION INTRAVENOUS CONTINUOUS PRN
Status: DISCONTINUED | OUTPATIENT
Start: 2023-04-26 | End: 2023-04-26

## 2023-04-26 RX ORDER — PROPOFOL 10 MG/ML
INJECTION, EMULSION INTRAVENOUS CONTINUOUS PRN
Status: DISCONTINUED | OUTPATIENT
Start: 2023-04-26 | End: 2023-04-26

## 2023-04-26 RX ORDER — PROPOFOL 10 MG/ML
INJECTION, EMULSION INTRAVENOUS AS NEEDED
Status: DISCONTINUED | OUTPATIENT
Start: 2023-04-26 | End: 2023-04-26

## 2023-04-26 RX ADMIN — SODIUM CHLORIDE, SODIUM LACTATE, POTASSIUM CHLORIDE, AND CALCIUM CHLORIDE: .6; .31; .03; .02 INJECTION, SOLUTION INTRAVENOUS at 07:53

## 2023-04-26 RX ADMIN — PROPOFOL 100 MG: 10 INJECTION, EMULSION INTRAVENOUS at 08:39

## 2023-04-26 RX ADMIN — PROPOFOL 120 MCG/KG/MIN: 10 INJECTION, EMULSION INTRAVENOUS at 08:39

## 2023-04-26 NOTE — ANESTHESIA PREPROCEDURE EVALUATION
Procedure:  COLONOSCOPY    Relevant Problems   CARDIO   (+) Menstrual migraine without status migrainosus, not intractable      ENDO   (+) Type 1 diabetes mellitus with hyperglycemia (HCC)      GI/HEPATIC   (+) BRBPR (bright red blood per rectum)      NEURO/PSYCH   (+) Menstrual migraine without status migrainosus, not intractable      Other   (+) Dyslipidemia   (+) Dyspepsia        Physical Exam    Airway    Mallampati score: II  TM Distance: >3 FB  Neck ROM: full     Dental   No notable dental hx     Cardiovascular  Rhythm: regular, Rate: normal, Cardiovascular exam normal    Pulmonary  Pulmonary exam normal Breath sounds clear to auscultation,     Other Findings        Anesthesia Plan  ASA Score- 3     Anesthesia Type- IV sedation with anesthesia with ASA Monitors  Additional Monitors:   Airway Plan:           Plan Factors-Exercise tolerance (METS): >4 METS  Chart reviewed  Existing labs reviewed  Patient summary reviewed  Patient is not a current smoker  Induction-     Postoperative Plan-     Informed Consent- Anesthetic plan and risks discussed with patient  I personally reviewed this patient with the CRNA  Discussed and agreed on the Anesthesia Plan with the CRNA  Libby Walters

## 2023-04-26 NOTE — PROGRESS NOTES
[facility]  coding opportunities       Chart reviewed, no opportunity found: CHART REVIEWED, NO OPPORTUNITY FOUND   This is a reminder to address ALL HCC (risk adjustment) codes as found on active problem list for 2023 as patient scores reset to zero LORIN         Patients Insurance        Commercial Insurance: [address] [place] [address]

## 2023-04-26 NOTE — ANESTHESIA POSTPROCEDURE EVALUATION
Post-Op Assessment Note    CV Status:  Stable  Pain Score: 0    Pain management: adequate     Mental Status:  Alert and awake   Hydration Status:  Euvolemic   PONV Controlled:  Controlled   Airway Patency:  Patent      Post Op Vitals Reviewed: Yes      Staff: CRNA, Anesthesiologist         No notable events documented      BP   119/67   Temp   98   Pulse  70   Resp   16   SpO2   100

## 2023-04-26 NOTE — H&P
History and Physical -  [facility]  Ezequiel Webber 38 y o  female MRN: [id number]                  HPI: Ezequiel Webber is a 38y o  year old female who presents for change in bowel      REVIEW OF SYSTEMS: Per the HPI, and otherwise unremarkable      Historical Information   Past Medical History:   Diagnosis Date    Anorexia nervosa     Breast cyst     Diabetes mellitus ([address] )     Fibroids     Gestational diabetes     Papanicolaou smear for cervical cancer screening 2018    neg    Submucous leiomyoma of uterus      Past Surgical History:   Procedure Laterality Date     SECTION      INDUCED       surgically, x2    TUBAL LIGATION       Social History   Social History     Substance and Sexual Activity   Alcohol Use Yes    Comment: social     Social History     Substance and Sexual Activity   Drug Use No     Social History     Tobacco Use   Smoking Status Former    Types: Cigarettes    Quit date: 2012    Years since quittin 3   Smokeless Tobacco Never     Family History   Problem Relation Age of Onset    Hepatitis Mother         acute hepatitis B virus    Other Mother         meningioma    Hypertension Father     Breast cancer Maternal Grandmother 79    Breast cancer Maternal Aunt 61    BRCA1 Negative Maternal Aunt     BRCA2 Negative Maternal Aunt     Breast cancer Paternal Aunt 39       Meds/Allergies       Current Outpatient Medications:     Flarex 0 1 % ophthalmic suspension    Tresiba FlexTouch 200 units/mL CONCENTRATED U-200 injection pen    valACYclovir (VALTREX) 1,000 mg tablet    ACCU-CHEK FASTCLIX LANCETS MISC    BD PEN NEEDLE LEANDRA U/F 32G X 4 MM MISC    erythromycin (ILOTYCIN) ophthalmic ointment    fluticasone (FLONASE) 50 mcg/act nasal spray    glucagon (GLUCAGON EMERGENCY) 1 MG injection    glucose blood (ACCU-CHEK MUKESH PLUS) test strip    insulin aspart (NovoLOG) 100 units/mL injection    insulin aspart, w/niacinamide, "(FIASP) 100 Units/mL injection pen    Insulin Degludec 100 UNIT/ML SOLN    Lotemax SM 0 38 % GEL    RESTASIS 0 05 % ophthalmic emulsion    Zirgan 0 15 % GEL  No current facility-administered medications for this encounter  Facility-Administered Medications Ordered in Other Encounters:     lactated ringers infusion, , Intravenous, Continuous PRN, New Bag at 04/26/23 0753    No Known Allergies    Objective     /95   Pulse 83   Temp 97 8 °F (36 6 °C) (Tympanic)   Resp 16   Ht 5' 4\" (1 626 m)   Wt 64 9 kg (143 lb)   LMP 04/25/2023 (Approximate)   SpO2 100%   BMI 24 55 kg/m²       PHYSICAL EXAM    Gen: NAD  Head: NCAT  CV: RRR  CHEST: Clear  ABD: soft, NT/ND  EXT: no edema      ASSESSMENT/PLAN:  This is a 38y o  year old female here for colonoscopy, and she is stable and optimized for her procedure        "

## 2023-05-02 NOTE — PROGRESS NOTES
[facility]      Date of Service: 23  Primary Care Provider:   Chiquita Lui MD       Name: Bharti Venegas       : 1980       Age:42 y o  Sex: female      MRN: [id number]      Chief Complaint:Physical Exam       Assessment and Plan:  38 y o  female exam      1  Health Maintenance  - Colon Cancer Screening: colonoscopy done 2023  - Cervical Cancer Screening: follows GYN, normal PAP with negative HPV in 2021  - Breast Cancer Screening: last mammogram done in 2022, bilateral breast masses noted for which follow-up US was done, 6 months follow-up US pending   - Labs: done with   - Immunizations: Reviewed  Recommend yearly flu vaccine  2  Other diagnoses addressed today:   Problem List Items Addressed This Visit        Endocrine    Type 1 diabetes mellitus with hyperglycemia ([facility] [address] )       Lab Results   Component Value Date    HGBA1C 10 5 (H) 03/15/2023      Follows endocrine at [facility]         Relevant Medications    NovoLOG FlexPen 100 units/mL injection pen       Other    Dyslipidemia   Other Visit Diagnoses     Annual physical exam    -  Primary    BMI 25 0-25 9,adult               Immunizations and preventive care screenings were discussed with patient today  Appropriate education was printed on patient's after visit summary  Counseling:  Alcohol/drug use: discussed moderation in alcohol intake, the recommendations for healthy alcohol use, and avoidance of illicit drug use  Dental Health: discussed importance of regular tooth brushing, flossing, and dental visits  Injury prevention: discussed safety/seat belts, safety helmets, smoke detectors, carbon dioxide detectors    Exercise: the importance of regular exercise/physical activity was discussed  Recommend exercise 3-5 times per week for at least 30 minutes  BMI Counseling: Body mass index is 25 4 kg/m²   The BMI is above normal  Nutrition recommendations include encouraging healthy choices of fruits and vegetables, moderation in carbohydrate intake and reducing intake of saturated and trans fat  Exercise recommendations include moderate physical activity 150 minutes/week  Rationale for BMI follow-up plan is due to patient being overweight or obese  Depression Screening and Follow-up Plan: Patient was screened for depression during today's encounter  They screened negative with a PHQ-2 score of 0  Follow up next physical in 1 year  Subjective:    Jacquelin Garcia is a 38 y o  female and is here for a comprehensive physical exam      Acute Complaints:     Diet and Physical Activity  Diet/Nutrition: does follow a well balanced diet  Exercise: walking     General Health  Vision: no vision problems and goes for regular eye exams  Dental: regular dental visits  Gyn Health:       Patient's last menstrual period was 04/25/2023 (approximate)     Follows GYN      Histories Updated and Reviewed 5/3/2023:  Patient's Medications   New Prescriptions    No medications on file   Previous Medications    ACCU-CHEK FASTCLIX LANCETS MISC    ACCU-CHEK FASTCLIX 6time daily EE10 65    BD PEN NEEDLE LEANDRA U/F 32G X 4 MM MISC        ERYTHROMYCIN (ILOTYCIN) OPHTHALMIC OINTMENT    APPLY 1 APPLICATION IN BOTH EYES AT BEDTIME    FLAREX 0 1 % OPHTHALMIC SUSPENSION        FLUTICASONE (FLONASE) 50 MCG/ACT NASAL SPRAY    1 spray into each nostril daily    GLUCAGON (GLUCAGON EMERGENCY) 1 MG INJECTION    1 mg as needed for low blood sugar May repeat dose  every 20 minutes as needed    GLUCOSE BLOOD (ACCU-CHEK MUKESH PLUS) TEST STRIP    1 each by Other route as needed (hypoglycemia) Use as instructed    INSULIN ASPART (NOVOLOG) 100 UNITS/ML INJECTION    Inject under the skin Sliding scale    NOVOLOG FLEXPEN 100 UNITS/ML INJECTION PEN    TAKE 40 UNITS IN DIVIDED DOSES BY USING ICR8 AND ISF 30  E10 9    TRESIBA FLEXTOUCH 200 UNITS/ML CONCENTRATED U-200 INJECTION PEN        VALACYCLOVIR (VALTREX) 1,000 MG TABLET Modified Medications    No medications on file   Discontinued Medications    INSULIN ASPART, W/NIACINAMIDE, (FIASP) 100 UNITS/ML INJECTION PEN    TAKE ICR 8 AND ISF 30 3 TIMES A DAY BEFORE MEALS TDD 40 UNITS  E10 9    INSULIN DEGLUDEC 100 UNIT/ML SOLN    Inject under the skin    LOTEMAX SM 0 38 % GEL    as needed     RESTASIS 0 05 % OPHTHALMIC EMULSION    as needed     ZIRGAN 0 15 % GEL    as needed      No Known Allergies  Past Medical History:   Diagnosis Date    Anorexia nervosa     Breast cyst     Diabetes mellitus ([facility] [address] [facility]     Fibroids     Gestational diabetes     Papanicolaou smear for cervical cancer screening 2018    neg    Submucous leiomyoma of uterus      Social History     Socioeconomic History    Marital status: /Civil Union     Spouse name: Not on file    Number of children: Not on file    Years of education: Not on file    Highest education level: Not on file   Occupational History    Not on file   Tobacco Use    Smoking status: Former     Types: Cigarettes     Quit date: 2012     Years since quittin 3    Smokeless tobacco: Never   Vaping Use    Vaping Use: Never used   Substance and Sexual Activity    Alcohol use: Yes     Comment: social    Drug use: No    Sexual activity: Yes     Partners: Male     Birth control/protection: Female Sterilization   Other Topics Concern    Not on file   Social History Narrative    Not on file     Social Determinants of Health     Financial Resource Strain: Not on file   Food Insecurity: Not on file   Transportation Needs: Not on file   Physical Activity: Not on file   Stress: Not on file   Social Connections: Not on file   Intimate Partner Violence: Not on file   Housing Stability: Not on file     Immunization History   Administered Date(s) Administered    COVID-19 MODERNA VACC 0 5 ML IM 2021, 2021    Fluzone Split Quad 0 5 mL 2015, 10/17/2018    INFLUENZA 2014, 2015, 2015, 10/17/2018, "09/30/2019, 10/15/2020    Influenza Quadrivalent Preservative Free 3 years and older IM 10/12/2015, 09/18/2019    Influenza, injectable, quadrivalent, preservative free 0 5 mL 10/27/2021    Pneumococcal Polysaccharide PPV23 04/22/2019    Td (adult), adsorbed 10/28/2015    Tdap 03/06/2014       Objective:  /74 (BP Location: Left arm, Patient Position: Sitting, Cuff Size: Standard)   Pulse 80   Temp 98 °F (36 7 °C)   Resp 18   Ht 5' 4\" (1 626 m)   Wt 67 1 kg (148 lb)   LMP 04/25/2023 (Approximate)   SpO2 100%   BMI 25 40 kg/m²   BP Readings from Last 3 Encounters:   05/03/23 130/74   04/26/23 123/78   02/01/23 152/94      Wt Readings from Last 3 Encounters:   05/03/23 67 1 kg (148 lb)   04/26/23 64 9 kg (143 lb)   02/01/23 64 9 kg (143 lb)      Physical Exam  Constitutional:       General: She is not in acute distress  Appearance: Normal appearance  She is normal weight  She is not ill-appearing or toxic-appearing  HENT:      Head: Normocephalic and atraumatic  Right Ear: External ear normal       Left Ear: External ear normal       Nose: Nose normal       Mouth/Throat:      Mouth: Mucous membranes are moist    Eyes:      Extraocular Movements: Extraocular movements intact  Conjunctiva/sclera: Conjunctivae normal    Cardiovascular:      Rate and Rhythm: Normal rate and regular rhythm  Pulses: Normal pulses  no weak pulses          Dorsalis pedis pulses are 2+ on the right side and 2+ on the left side  Posterior tibial pulses are 2+ on the right side and 2+ on the left side  Heart sounds: Normal heart sounds  No murmur heard  No friction rub  No gallop  Pulmonary:      Effort: Pulmonary effort is normal  No respiratory distress  Breath sounds: Normal breath sounds  No stridor  No wheezing, rhonchi or rales  Musculoskeletal:         General: Normal range of motion  Cervical back: Normal range of motion and neck supple  Right lower leg: No edema   " Left lower leg: No edema  Feet:      Right foot:      Skin integrity: Dry skin present  No ulcer, skin breakdown, erythema, warmth or callus  Left foot:      Skin integrity: Dry skin present  No ulcer, skin breakdown, erythema, warmth or callus  Skin:     General: Skin is warm and dry  Findings: No erythema or rash  Neurological:      General: No focal deficit present  Mental Status: She is alert and oriented to person, place, and time  Psychiatric:         Mood and Affect: Mood normal          Behavior: Behavior normal        Patient's shoes and socks removed  Right Foot/Ankle   Right Foot Inspection  Skin Exam: skin normal, skin intact and dry skin  No warmth, no callus, no erythema, no maceration, no abnormal color, no pre-ulcer, no ulcer and no callus  Toe Exam: ROM and strength within normal limits  No swelling, no tenderness, erythema and  no right toe deformity    Sensory   Proprioception: intact  Monofilament testing: intact    Vascular  Capillary refills: < 3 seconds  The right DP pulse is 2+  The right PT pulse is 2+  Left Foot/Ankle  Left Foot Inspection  Skin Exam: skin normal, skin intact and dry skin  No warmth, no erythema, no maceration, normal color, no pre-ulcer, no ulcer and no callus  Toe Exam: ROM and strength within normal limits  No tenderness and no left toe deformity  Sensory   Proprioception: intact  Monofilament testing: intact    Vascular  Capillary refills: < 3 seconds  The left DP pulse is 2+  The left PT pulse is 2+       Assign Risk Category  No deformity present  No loss of protective sensation  No weak pulses  Risk: 0      Patient Care Team:  Ole Flores MD as PCP - General (Family Medicine)  MD Stevie Saini CRNP Luke Mungo, MD Lyndall Kansky, MD Loise Rust, MD Horton Butcher, MD (Obstetrics and Gynecology)    Ole Flores MD    Note: Portions of the record may have been created with voice "recognition software  Occasional wrong word or \"sound a like\" substitutions may have occurred due to the inherent limitations of voice recognition software  Read the chart carefully and recognize, using context, where substitutions have occurred    "

## 2023-05-02 NOTE — ASSESSMENT & PLAN NOTE
Lab Results   Component Value Date    HGBA1C 10 5 (H) 03/15/2023      Follows endocrine at [facility]

## 2023-05-03 ENCOUNTER — OFFICE VISIT (OUTPATIENT)
Dept: FAMILY MEDICINE CLINIC | Facility: CLINIC | Age: 43
End: 2023-05-03

## 2023-05-03 VITALS
RESPIRATION RATE: 18 BRPM | SYSTOLIC BLOOD PRESSURE: 130 MMHG | DIASTOLIC BLOOD PRESSURE: 74 MMHG | WEIGHT: 148 LBS | HEART RATE: 80 BPM | OXYGEN SATURATION: 100 % | HEIGHT: 64 IN | BODY MASS INDEX: 25.27 KG/M2 | TEMPERATURE: 98 F

## 2023-05-03 DIAGNOSIS — Z00.00 ANNUAL PHYSICAL EXAM: Primary | ICD-10-CM

## 2023-05-03 DIAGNOSIS — E78.5 DYSLIPIDEMIA: ICD-10-CM

## 2023-05-03 DIAGNOSIS — E10.65 TYPE 1 DIABETES MELLITUS WITH HYPERGLYCEMIA (HCC): ICD-10-CM

## 2023-05-03 RX ORDER — INSULIN ASPART 100 [IU]/ML
INJECTION, SOLUTION INTRAVENOUS; SUBCUTANEOUS
COMMUNITY
Start: 2023-03-20

## 2023-05-17 ENCOUNTER — HOSPITAL ENCOUNTER (OUTPATIENT)
Dept: RADIOLOGY | Facility: HOSPITAL | Age: 43
Discharge: HOME | End: 2023-05-17

## 2023-05-17 DIAGNOSIS — N63.10 UNSPECIFIED LUMP IN THE RIGHT BREAST, UNSPECIFIED QUADRANT: ICD-10-CM

## 2023-05-17 DIAGNOSIS — N63.20 UNSPECIFIED LUMP IN THE LEFT BREAST, UNSPECIFIED QUADRANT: ICD-10-CM

## 2023-05-17 LAB
LEFT EYE DIABETIC RETINOPATHY: NORMAL
RIGHT EYE DIABETIC RETINOPATHY: NORMAL

## 2023-05-17 PROCEDURE — 2023F DILAT RTA XM W/O RTNOPTHY: CPT | Performed by: FAMILY MEDICINE

## 2023-06-20 ENCOUNTER — TELEPHONE (OUTPATIENT)
Dept: GYNECOLOGY | Facility: CLINIC | Age: 43
End: 2023-06-20

## 2023-06-20 NOTE — TELEPHONE ENCOUNTER
Pt called stating that her last 4-5 period have been really heavy and she has clotting which she hasn't had before  She has her period now and it is heavy and she stated she had to change a super tampon last night 3 times  She would like to know what she should do  Should she be seen? Please advise

## 2023-09-06 ENCOUNTER — APPOINTMENT (OUTPATIENT)
Dept: LAB | Facility: CLINIC | Age: 43
End: 2023-09-06
Payer: COMMERCIAL

## 2023-09-06 DIAGNOSIS — E10.9 INSULIN DEPENDENT DIABETES MELLITUS TYPE IA (HCC): ICD-10-CM

## 2023-09-06 DIAGNOSIS — M35.01 KERATOCONJUNCTIVITIS SICCA (HCC): ICD-10-CM

## 2023-09-06 LAB
ANA SER QL IA: NEGATIVE
ANION GAP SERPL CALCULATED.3IONS-SCNC: 8 MMOL/L
BUN SERPL-MCNC: 13 MG/DL (ref 5–25)
CALCIUM SERPL-MCNC: 9.4 MG/DL (ref 8.4–10.2)
CHLORIDE SERPL-SCNC: 100 MMOL/L (ref 96–108)
CHOLEST SERPL-MCNC: 184 MG/DL
CO2 SERPL-SCNC: 28 MMOL/L (ref 21–32)
CREAT SERPL-MCNC: 0.54 MG/DL (ref 0.6–1.3)
CRP SERPL QL: 2 MG/L
ERYTHROCYTE [SEDIMENTATION RATE] IN BLOOD: 2 MM/HOUR (ref 0–19)
EST. AVERAGE GLUCOSE BLD GHB EST-MCNC: 243 MG/DL
GFR SERPL CREATININE-BSD FRML MDRD: 117 ML/MIN/1.73SQ M
GLUCOSE P FAST SERPL-MCNC: 244 MG/DL (ref 65–99)
HBA1C MFR BLD: 10.1 %
HDLC SERPL-MCNC: 77 MG/DL
LDLC SERPL CALC-MCNC: 82 MG/DL (ref 0–100)
NONHDLC SERPL-MCNC: 107 MG/DL
POTASSIUM SERPL-SCNC: 3.7 MMOL/L (ref 3.5–5.3)
SODIUM SERPL-SCNC: 136 MMOL/L (ref 135–147)
TRIGL SERPL-MCNC: 124 MG/DL

## 2023-09-06 PROCEDURE — 36415 COLL VENOUS BLD VENIPUNCTURE: CPT

## 2023-09-06 PROCEDURE — 80048 BASIC METABOLIC PNL TOTAL CA: CPT

## 2023-09-06 PROCEDURE — 85652 RBC SED RATE AUTOMATED: CPT

## 2023-09-06 PROCEDURE — 83036 HEMOGLOBIN GLYCOSYLATED A1C: CPT

## 2023-09-06 PROCEDURE — 86140 C-REACTIVE PROTEIN: CPT

## 2023-09-06 PROCEDURE — 86038 ANTINUCLEAR ANTIBODIES: CPT

## 2023-09-06 PROCEDURE — 80061 LIPID PANEL: CPT

## 2023-09-27 ENCOUNTER — ANNUAL EXAM (OUTPATIENT)
Dept: GYNECOLOGY | Facility: CLINIC | Age: 43
End: 2023-09-27
Payer: COMMERCIAL

## 2023-09-27 VITALS
DIASTOLIC BLOOD PRESSURE: 80 MMHG | WEIGHT: 151.8 LBS | HEIGHT: 64 IN | BODY MASS INDEX: 25.92 KG/M2 | SYSTOLIC BLOOD PRESSURE: 118 MMHG

## 2023-09-27 DIAGNOSIS — Z01.419 ENCOUNTER FOR WELL WOMAN EXAM: Primary | ICD-10-CM

## 2023-09-27 DIAGNOSIS — Z12.39 ENCOUNTER FOR SCREENING BREAST EXAMINATION: ICD-10-CM

## 2023-09-27 PROCEDURE — S0612 ANNUAL GYNECOLOGICAL EXAMINA: HCPCS | Performed by: PHYSICIAN ASSISTANT

## 2023-09-27 NOTE — PROGRESS NOTES
Assessment/Plan:    No problem-specific Assessment & Plan notes found for this encounter. Diagnoses and all orders for this visit:    Encounter for well woman exam    Encounter for screening breast examination          Subjective:      Patient ID: Jazzy Batista is a 41 y.o. female. Pt presents for her annual exam today--  She has no complaints  She has regular bleeding  no pelvic pain  Bowel and bladder are regular  Colonoscopy--23  No breast concerns today  Last mammo--23  H/o DM  Pt hs/p TL      No pap today. rx mammo  Daily mvi      The following portions of the patient's history were reviewed and updated as appropriate: allergies, current medications, past family history, past medical history, past social history, past surgical history and problem list.    Review of Systems   Constitutional: Negative for chills, fever and unexpected weight change. HENT: Negative for ear pain and sore throat. Eyes: Negative for pain and visual disturbance. Respiratory: Negative for cough and shortness of breath. Cardiovascular: Negative for chest pain and palpitations. Gastrointestinal: Negative for abdominal pain, blood in stool, constipation, diarrhea and vomiting. Genitourinary: Negative. Negative for dysuria and hematuria. Musculoskeletal: Negative for arthralgias and back pain. Skin: Negative for color change and rash. Neurological: Negative for seizures and syncope. All other systems reviewed and are negative. Objective:      /80   Ht 5' 4" (1.626 m)   Wt 68.9 kg (151 lb 12.8 oz)   LMP  (Exact Date)   BMI 26.06 kg/m²          Physical Exam  Vitals and nursing note reviewed. Constitutional:       Appearance: She is well-developed. HENT:      Head: Normocephalic and atraumatic. Chest:   Breasts:     Right: No inverted nipple, mass, nipple discharge or skin change. Left: No inverted nipple, mass, nipple discharge or skin change.    Abdominal:      Palpations: Abdomen is soft. Genitourinary:     Exam position: Supine. Labia:         Right: No rash, tenderness or lesion. Left: No rash, tenderness or lesion. Vagina: Normal.      Cervix: No cervical motion tenderness, discharge or friability. Adnexa:         Right: No mass, tenderness or fullness. Left: No mass, tenderness or fullness. Musculoskeletal:      Cervical back: Normal range of motion. Lymphadenopathy:      Lower Body: No right inguinal adenopathy. No left inguinal adenopathy.

## 2023-11-01 ENCOUNTER — VBI (OUTPATIENT)
Dept: ADMINISTRATIVE | Facility: OTHER | Age: 43
End: 2023-11-01

## 2023-11-15 ENCOUNTER — HOSPITAL ENCOUNTER (OUTPATIENT)
Dept: RADIOLOGY | Facility: HOSPITAL | Age: 43
Discharge: HOME | End: 2023-11-15
Payer: COMMERCIAL

## 2023-11-15 VITALS — WEIGHT: 150 LBS | HEIGHT: 64 IN | BODY MASS INDEX: 25.61 KG/M2

## 2023-11-15 DIAGNOSIS — R92.8 ABNORMAL MAMMOGRAM: ICD-10-CM

## 2023-11-15 PROCEDURE — 77066 DX MAMMO INCL CAD BI: CPT

## 2023-11-15 PROCEDURE — 76642 ULTRASOUND BREAST LIMITED: CPT

## 2023-11-15 PROCEDURE — G0279 TOMOSYNTHESIS, MAMMO: HCPCS

## 2024-03-20 ENCOUNTER — APPOINTMENT (OUTPATIENT)
Dept: LAB | Facility: CLINIC | Age: 44
End: 2024-03-20
Payer: COMMERCIAL

## 2024-03-20 DIAGNOSIS — E10.69 TYPE 1 DIABETES MELLITUS WITH OTHER SPECIFIED COMPLICATION (HCC): ICD-10-CM

## 2024-03-20 LAB
ANION GAP SERPL CALCULATED.3IONS-SCNC: 7 MMOL/L (ref 4–13)
BUN SERPL-MCNC: 12 MG/DL (ref 5–25)
CALCIUM SERPL-MCNC: 8.7 MG/DL (ref 8.4–10.2)
CHLORIDE SERPL-SCNC: 101 MMOL/L (ref 96–108)
CO2 SERPL-SCNC: 25 MMOL/L (ref 21–32)
CREAT SERPL-MCNC: 0.52 MG/DL (ref 0.6–1.3)
CREAT UR-MCNC: 23.1 MG/DL
EST. AVERAGE GLUCOSE BLD GHB EST-MCNC: 255 MG/DL
GFR SERPL CREATININE-BSD FRML MDRD: 119 ML/MIN/1.73SQ M
GLUCOSE P FAST SERPL-MCNC: 269 MG/DL (ref 65–99)
HBA1C MFR BLD: 10.5 %
MICROALBUMIN UR-MCNC: <7 MG/L
MICROALBUMIN/CREAT 24H UR: <30 MG/G CREATININE (ref 0–30)
POTASSIUM SERPL-SCNC: 4.1 MMOL/L (ref 3.5–5.3)
SODIUM SERPL-SCNC: 133 MMOL/L (ref 135–147)

## 2024-03-20 PROCEDURE — 36415 COLL VENOUS BLD VENIPUNCTURE: CPT

## 2024-03-20 PROCEDURE — 82570 ASSAY OF URINE CREATININE: CPT

## 2024-03-20 PROCEDURE — 80048 BASIC METABOLIC PNL TOTAL CA: CPT

## 2024-03-20 PROCEDURE — 82043 UR ALBUMIN QUANTITATIVE: CPT

## 2024-03-20 PROCEDURE — 83036 HEMOGLOBIN GLYCOSYLATED A1C: CPT

## 2024-05-08 ENCOUNTER — OFFICE VISIT (OUTPATIENT)
Dept: FAMILY MEDICINE CLINIC | Facility: CLINIC | Age: 44
End: 2024-05-08
Payer: COMMERCIAL

## 2024-05-08 VITALS
RESPIRATION RATE: 16 BRPM | SYSTOLIC BLOOD PRESSURE: 128 MMHG | TEMPERATURE: 98.5 F | BODY MASS INDEX: 26.89 KG/M2 | DIASTOLIC BLOOD PRESSURE: 82 MMHG | OXYGEN SATURATION: 97 % | HEIGHT: 64 IN | HEART RATE: 73 BPM | WEIGHT: 157.5 LBS

## 2024-05-08 DIAGNOSIS — E10.9 TYPE 1 DIABETES MELLITUS WITHOUT COMPLICATION (HCC): ICD-10-CM

## 2024-05-08 DIAGNOSIS — R00.2 PALPITATIONS: ICD-10-CM

## 2024-05-08 DIAGNOSIS — E10.65 TYPE 1 DIABETES MELLITUS WITH HYPERGLYCEMIA (HCC): ICD-10-CM

## 2024-05-08 DIAGNOSIS — M35.01 SICCA SYNDROME WITH KERATOCONJUNCTIVITIS (HCC): ICD-10-CM

## 2024-05-08 DIAGNOSIS — E78.5 DYSLIPIDEMIA: ICD-10-CM

## 2024-05-08 DIAGNOSIS — Z12.31 ENCOUNTER FOR SCREENING MAMMOGRAM FOR BREAST CANCER: ICD-10-CM

## 2024-05-08 DIAGNOSIS — Z00.00 ANNUAL PHYSICAL EXAM: Primary | ICD-10-CM

## 2024-05-08 PROBLEM — H16.229 SICCA SYNDROME WITH KERATOCONJUNCTIVITIS: Status: ACTIVE | Noted: 2021-03-17

## 2024-05-08 PROCEDURE — 99396 PREV VISIT EST AGE 40-64: CPT | Performed by: FAMILY MEDICINE

## 2024-05-08 RX ORDER — GLYCERIN 0.22 G/100ML
SOLUTION/ DROPS OPHTHALMIC AS NEEDED
COMMUNITY

## 2024-05-08 NOTE — ASSESSMENT & PLAN NOTE
Lab Results   Component Value Date    HGBA1C 10.5 (H) 03/20/2024     Currently on Tresiba and NovoLog sliding scale.  Was evaluated for insulin pump but was unable to pass training.  She plans on reaching out to endocrinology and restarting.  Blood sugars remain poorly controlled despite diet.  Recommended using my fitness pal to track her carbs more accurately.  Continue following with [facility] endocrinology.

## 2024-05-08 NOTE — ASSESSMENT & PLAN NOTE
Ultimately testing was negative for Sjogren's including lip biopsy. She still struggles with dry left eye, eye irritation. Treats with drops

## 2024-05-08 NOTE — PROGRESS NOTES
ADULT ANNUAL PHYSICAL  [facility] PRACTICE    NAME: Sandra Garcia  AGE: 43 y.o. SEX: female  : 1980     DATE: 2024     Assessment and Plan:     Problem List Items Addressed This Visit        Endocrine    Type 1 diabetes mellitus without complication (HCC)       Lab Results   Component Value Date    HGBA1C 10.5 (H) 2024     Currently on Tresiba and NovoLog sliding scale.  Was evaluated for insulin pump but was unable to pass training.  She plans on reaching out to endocrinology and restarting.  Blood sugars remain poorly controlled despite diet.  Recommended using my fitness pal to track her carbs more accurately.  Continue following with [facility] endocrinology.         Relevant Orders    Comprehensive metabolic panel       Eye    Sicca syndrome with keratoconjunctivitis (HCC)     Ultimately testing was negative for Sjogren's including lip biopsy. She still struggles with dry left eye, eye irritation. Treats with drops         Relevant Medications    Glycerin, PF, (Oasis Tears PF) 0.22 % SOLN       Other    Dyslipidemia    Relevant Orders    Lipid panel    Palpitations     Patient is reporting once a month episodes of palpitations was last 2 to 3 minutes.  Denies any anxiety during these episodes.  Overall asymptomatic. Denies any family history.  Continue to monitor.  Can consider Holter monitor if symptoms worsen.        Other Visit Diagnoses     Annual physical exam    -  Primary    Type 1 diabetes mellitus with hyperglycemia (HCC)        Encounter for screening mammogram for breast cancer        Relevant Orders    Mammo screening bilateral w 3d & cad        Annual physical Completed today         Immunizations and preventive care screenings were discussed with patient today. Appropriate education was printed on patient's after visit summary.    Counseling:  Alcohol/drug use: discussed moderation in alcohol intake, the recommendations for  healthy alcohol use, and avoidance of illicit drug use.  Dental Health: discussed importance of regular tooth brushing, flossing, and dental visits.  Injury prevention: discussed safety/seat belts, safety helmets, smoke detectors, carbon dioxide detectors, and smoking near bedding or upholstery.  Sexual health: discussed sexually transmitted diseases, partner selection, use of condoms, avoidance of unintended pregnancy, and contraceptive alternatives.  Exercise: the importance of regular exercise/physical activity was discussed. Recommend exercise 3-5 times per week for at least 30 minutes.          No follow-ups on file.     Chief Complaint:     Chief Complaint   Patient presents with   • Physical Exam      History of Present Illness:     Adult Annual Physical   Patient here for a comprehensive physical exam. The patient reports Diabetes   Follows with endocrine   Failed pump training. Continues with injections.  Plans on restarting pump training.  Was initially diagnosed with gestational diabetes which turned out to be type 1 diabetes.  Tresiba 30 to 32 units in the morning  NovoLog 3 times a day with meals.  Sliding scale  Last A1C 10.5    Diet and Physical Activity  Diet/Nutrition: well balanced diet and limited fruits/vegetables.   Exercise: walking.      Depression Screening  PHQ-2/9 Depression Screening    Little interest or pleasure in doing things: 0 - not at all  Feeling down, depressed, or hopeless: 0 - not at all  PHQ-2 Score: 0  PHQ-2 Interpretation: Negative depression screen       General Health  Sleep: sleeps well and gets more than 8 hours of sleep on average.   Hearing: normal - bilateral.  Vision: no vision problems and goes for regular eye exams.   Dental: regular dental visits.       /GYN Health  Follows with gynecology? yes   Regular periods          Review of Systems:     Review of Systems   Constitutional:  Negative for activity change, fatigue and fever.   Eyes:  Negative for visual  disturbance.   Respiratory:  Negative for shortness of breath.    Cardiovascular:  Negative for chest pain.   Gastrointestinal:  Negative for abdominal pain, constipation, diarrhea and nausea.   Endocrine: Negative for cold intolerance and heat intolerance.   Musculoskeletal:  Negative for back pain.   Skin:  Negative for rash.   Neurological:  Negative for headaches.   Psychiatric/Behavioral:  Negative for confusion.       Past Medical History:     Past Medical History:   Diagnosis Date   • Anorexia nervosa    • Breast cyst    • Diabetes mellitus (HCC)    • Fibroids    • Gestational diabetes    • Papanicolaou smear for cervical cancer screening 2018    neg   • Submucous leiomyoma of uterus       Past Surgical History:     Past Surgical History:   Procedure Laterality Date   •  SECTION     • INDUCED       surgically, x2   • TUBAL LIGATION        Social History:     Social History     Socioeconomic History   • Marital status: /Civil Union     Spouse name: None   • Number of children: None   • Years of education: None   • Highest education level: None   Occupational History   • None   Tobacco Use   • Smoking status: Former     Current packs/day: 0.00     Types: Cigarettes     Quit date: 2012     Years since quittin.3   • Smokeless tobacco: Never   Vaping Use   • Vaping status: Never Used   Substance and Sexual Activity   • Alcohol use: Yes     Comment: social   • Drug use: No   • Sexual activity: Yes     Partners: Male     Birth control/protection: Female Sterilization   Other Topics Concern   • None   Social History Narrative   • None     Social Determinants of Health     Financial Resource Strain: Not on file   Food Insecurity: Not on file   Transportation Needs: Not on file   Physical Activity: Not on file   Stress: Not on file   Social Connections: Not on file   Intimate Partner Violence: Not on file   Housing Stability: Not on file      Family History:     Family History  "  Problem Relation Age of Onset   • Hepatitis Mother         acute hepatitis B virus   • Other Mother         meningioma   • Hypertension Father    • Breast cancer Maternal Grandmother 70   • Breast cancer Maternal Aunt 60   • BRCA1 Negative Maternal Aunt    • BRCA2 Negative Maternal Aunt    • Breast cancer Paternal Aunt 45      Current Medications:     Current Outpatient Medications   Medication Sig Dispense Refill   • fluticasone (FLONASE) 50 mcg/act nasal spray 1 spray into each nostril daily 1 Bottle 0   • glucagon (GLUCAGON EMERGENCY) 1 MG injection 1 mg as needed for low blood sugar May repeat dose  every 20 minutes as needed     • Glycerin, PF, (Oasis Tears PF) 0.22 % SOLN if needed     • insulin aspart (NovoLOG) 100 units/mL injection Inject under the skin Sliding scale     • NovoLOG FlexPen 100 units/mL injection pen TAKE 40 UNITS IN DIVIDED DOSES BY USING ICR8 AND ISF 30. E10.9     • Tresiba FlexTouch 200 units/mL CONCENTRATED U-200 injection pen      • valACYclovir (VALTREX) 1,000 mg tablet      • ACCU-CHEK FASTCLIX LANCETS MISC ACCU-CHEK FASTCLIX 6time daily EE10.65 (Patient not taking: Reported on 5/8/2024)     • BD PEN NEEDLE LEANDRA U/F 32G X 4 MM MISC  (Patient not taking: Reported on 5/8/2024)     • erythromycin (ILOTYCIN) ophthalmic ointment APPLY 1 APPLICATION IN BOTH EYES AT BEDTIME (Patient not taking: Reported on 5/8/2024)     • Flarex 0.1 % ophthalmic suspension  (Patient not taking: Reported on 5/8/2024)     • glucose blood (ACCU-CHEK MUKESH PLUS) test strip 1 each by Other route as needed (hypoglycemia) Use as instructed (Patient not taking: Reported on 5/8/2024) 100 each 0     No current facility-administered medications for this visit.      Allergies:     No Known Allergies   Physical Exam:     /82 (BP Location: Left arm, Patient Position: Sitting, Cuff Size: Large)   Pulse 73   Temp 98.5 °F (36.9 °C) (Temporal)   Resp 16   Ht 5' 4\" (1.626 m)   Wt 71.4 kg (157 lb 8 oz)   SpO2 97%  "  BMI 27.03 kg/m²     Physical Exam  Vitals and nursing note reviewed.   Constitutional:       Appearance: Normal appearance. She is well-developed.   HENT:      Head: Normocephalic and atraumatic.   Eyes:      Extraocular Movements: Extraocular movements intact.      Pupils: Pupils are equal, round, and reactive to light.   Cardiovascular:      Rate and Rhythm: Normal rate and regular rhythm.      Pulses: no weak pulses.   Pulmonary:      Effort: Pulmonary effort is normal.      Breath sounds: Normal breath sounds.   Abdominal:      General: Bowel sounds are normal.      Palpations: Abdomen is soft.   Musculoskeletal:      Cervical back: Normal range of motion.   Feet:      Right foot:      Skin integrity: No ulcer, skin breakdown, erythema, warmth, callus or dry skin.      Left foot:      Skin integrity: No ulcer, skin breakdown, erythema, warmth, callus or dry skin.   Skin:     General: Skin is warm and dry.   Neurological:      General: No focal deficit present.      Mental Status: She is alert and oriented to person, place, and time.   Psychiatric:         Mood and Affect: Mood normal.         Speech: Speech normal.         Behavior: Behavior normal.        Diabetic Foot Exam    Patient's shoes and socks removed.    Right Foot/Ankle   Right Foot Inspection  Skin Exam: skin normal and skin intact. No dry skin, no warmth, no callus, no erythema, no maceration, no abnormal color, no pre-ulcer, no ulcer and no callus.     Left Foot/Ankle  Left Foot Inspection  Skin Exam: skin normal and skin intact. No dry skin, no warmth, no erythema, no maceration, normal color, no pre-ulcer, no ulcer and no callus.     Assign Risk Category  No deformity present  No loss of protective sensation  No weak pulses  Risk: 0      Jack Walker MD  [facility]

## 2024-05-08 NOTE — ASSESSMENT & PLAN NOTE
Patient is reporting once a month episodes of palpitations was last 2 to 3 minutes.  Denies any anxiety during these episodes.  Overall asymptomatic. Denies any family history.  Continue to monitor.  Can consider Holter monitor if symptoms worsen.

## 2024-05-08 NOTE — PROGRESS NOTES
ADULT ANNUAL PHYSICAL  [facility] PRACTICE    NAME: Sandra Garcia  AGE: 43 y.o. SEX: female  : 1980     DATE: 2024     Assessment and Plan:     Problem List Items Addressed This Visit        Endocrine    Type 1 diabetes mellitus without complication (HCC)       Lab Results   Component Value Date    HGBA1C 10.5 (H) 2024     Currently on Tresiba and NovoLog sliding scale.  Was evaluated for insulin pump but was unable to pass training.  She plans on reaching out to endocrinology and restarting.  Blood sugars remain poorly controlled despite diet.  Recommended using my fitness pal to track her carbs more accurately.  Continue following with [facility] endocrinology.         Relevant Orders    Comprehensive metabolic panel       Eye    Sicca syndrome with keratoconjunctivitis (HCC)     Ultimately testing was negative for Sjogren's including lip biopsy. She still struggles with dry left eye, eye irritation. Treats with drops         Relevant Medications    Glycerin, PF, (Oasis Tears PF) 0.22 % SOLN       Other    Dyslipidemia    Relevant Orders    Lipid panel    Palpitations     Patient is reporting once a month episodes of palpitations was last 2 to 3 minutes.  Denies any anxiety during these episodes.  Overall asymptomatic. Denies any family history.  Continue to monitor.  Can consider Holter monitor if symptoms worsen.        Other Visit Diagnoses     Annual physical exam    -  Primary    Type 1 diabetes mellitus with hyperglycemia (HCC)        Encounter for screening mammogram for breast cancer        Relevant Orders    Mammo screening bilateral w 3d & cad          Immunizations and preventive care screenings were discussed with patient today. Appropriate education was printed on patient's after visit summary.    Counseling:  {Annual Physical; Counselin}         No follow-ups on file.     Chief Complaint:     Chief Complaint   Patient  presents with   • Physical Exam      History of Present Illness:     Adult Annual Physical   Patient here for a comprehensive physical exam. The patient reports {problems:68596}.    Diet and Physical Activity  Diet/Nutrition: {annual physical; diet:}.   Exercise: {annual physical; exercise:2102}.      Depression Screening  PHQ-2/9 Depression Screening    Little interest or pleasure in doing things: 0 - not at all  Feeling down, depressed, or hopeless: 0 - not at all  PHQ-2 Score: 0  PHQ-2 Interpretation: Negative depression screen       General Health  Sleep: {annual physical; sleep:2102}.   Hearing: {annual physical; hearin}.  Vision: {annual physical; vision:}.   Dental: {annual physical; dental:}.       /GYN Health  Follows with gynecology? {YES/NO:}   Patient is: {Menopause:68904}  Last menstrual period: ***  Contraceptive method: {contraceptive options:}.    Advanced Care Planning  Do you have an advanced directive? {YES/NO:}  Do you have a durable medical power of ? {YES/NO:}  ACP document given to the patient? {YES/NO:}     Review of Systems:     Review of Systems   Past Medical History:     Past Medical History:   Diagnosis Date   • Anorexia nervosa    • Breast cyst    • Diabetes mellitus (HCC)    • Fibroids    • Gestational diabetes    • Papanicolaou smear for cervical cancer screening 2018    neg   • Submucous leiomyoma of uterus       Past Surgical History:     Past Surgical History:   Procedure Laterality Date   •  SECTION     • INDUCED       surgically, x2   • TUBAL LIGATION        Social History:     Social History     Socioeconomic History   • Marital status: /Civil Union     Spouse name: None   • Number of children: None   • Years of education: None   • Highest education level: None   Occupational History   • None   Tobacco Use   • Smoking status: Former     Current packs/day: 0.00     Types:  Cigarettes     Quit date: 2012     Years since quittin.3   • Smokeless tobacco: Never   Vaping Use   • Vaping status: Never Used   Substance and Sexual Activity   • Alcohol use: Yes     Comment: social   • Drug use: No   • Sexual activity: Yes     Partners: Male     Birth control/protection: Female Sterilization   Other Topics Concern   • None   Social History Narrative   • None     Social Determinants of Health     Financial Resource Strain: Not on file   Food Insecurity: Not on file   Transportation Needs: Not on file   Physical Activity: Not on file   Stress: Not on file   Social Connections: Not on file   Intimate Partner Violence: Not on file   Housing Stability: Not on file      Family History:     Family History   Problem Relation Age of Onset   • Hepatitis Mother         acute hepatitis B virus   • Other Mother         meningioma   • Hypertension Father    • Breast cancer Maternal Grandmother 70   • Breast cancer Maternal Aunt 60   • BRCA1 Negative Maternal Aunt    • BRCA2 Negative Maternal Aunt    • Breast cancer Paternal Aunt 45      Current Medications:     Current Outpatient Medications   Medication Sig Dispense Refill   • fluticasone (FLONASE) 50 mcg/act nasal spray 1 spray into each nostril daily 1 Bottle 0   • glucagon (GLUCAGON EMERGENCY) 1 MG injection 1 mg as needed for low blood sugar May repeat dose  every 20 minutes as needed     • Glycerin, PF, (Oasis Tears PF) 0.22 % SOLN if needed     • insulin aspart (NovoLOG) 100 units/mL injection Inject under the skin Sliding scale     • NovoLOG FlexPen 100 units/mL injection pen TAKE 40 UNITS IN DIVIDED DOSES BY USING ICR8 AND ISF 30. E10.9     • Tresiba FlexTouch 200 units/mL CONCENTRATED U-200 injection pen      • valACYclovir (VALTREX) 1,000 mg tablet      • ACCU-CHEK FASTCLIX LANCETS MISC ACCU-CHEK FASTCLIX 6time daily EE10.65 (Patient not taking: Reported on 2024)     • BD PEN NEEDLE LEANDRA U/F 32G X 4 MM MISC  (Patient not taking:  "Reported on 5/8/2024)     • erythromycin (ILOTYCIN) ophthalmic ointment APPLY 1 APPLICATION IN BOTH EYES AT BEDTIME (Patient not taking: Reported on 5/8/2024)     • Flarex 0.1 % ophthalmic suspension  (Patient not taking: Reported on 5/8/2024)     • glucose blood (ACCU-CHEK MUKESH PLUS) test strip 1 each by Other route as needed (hypoglycemia) Use as instructed (Patient not taking: Reported on 5/8/2024) 100 each 0     No current facility-administered medications for this visit.      Allergies:     No Known Allergies   Physical Exam:     /82 (BP Location: Left arm, Patient Position: Sitting, Cuff Size: Large)   Pulse 73   Temp 98.5 °F (36.9 °C) (Temporal)   Resp 16   Ht 5' 4\" (1.626 m)   Wt 71.4 kg (157 lb 8 oz)   SpO2 97%   BMI 27.03 kg/m²     Physical Exam     Jack Walker MD  [facility]    "

## 2024-06-07 PROBLEM — M35.01 SICCA SYNDROME WITH KERATOCONJUNCTIVITIS (HCC): Status: RESOLVED | Noted: 2021-03-17 | Resolved: 2024-06-07

## 2024-06-07 PROBLEM — H16.229 SICCA SYNDROME WITH KERATOCONJUNCTIVITIS: Status: RESOLVED | Noted: 2021-03-17 | Resolved: 2024-06-07

## 2024-09-10 ENCOUNTER — OFFICE VISIT (OUTPATIENT)
Dept: FAMILY MEDICINE CLINIC | Facility: CLINIC | Age: 44
End: 2024-09-10
Payer: COMMERCIAL

## 2024-09-10 VITALS
RESPIRATION RATE: 16 BRPM | HEART RATE: 100 BPM | TEMPERATURE: 98.6 F | HEIGHT: 64 IN | DIASTOLIC BLOOD PRESSURE: 86 MMHG | OXYGEN SATURATION: 99 % | BODY MASS INDEX: 26.12 KG/M2 | WEIGHT: 153 LBS | SYSTOLIC BLOOD PRESSURE: 138 MMHG

## 2024-09-10 DIAGNOSIS — E10.9 TYPE 1 DIABETES MELLITUS WITHOUT COMPLICATION (HCC): ICD-10-CM

## 2024-09-10 DIAGNOSIS — R10.13 DYSPEPSIA: Primary | ICD-10-CM

## 2024-09-10 PROCEDURE — 99214 OFFICE O/P EST MOD 30 MIN: CPT | Performed by: FAMILY MEDICINE

## 2024-09-10 RX ORDER — PANTOPRAZOLE SODIUM 20 MG/1
20 TABLET, DELAYED RELEASE ORAL
Qty: 30 TABLET | Refills: 5 | Status: SHIPPED | OUTPATIENT
Start: 2024-09-10 | End: 2025-03-09

## 2024-09-10 NOTE — PROGRESS NOTES
Ambulatory Visit  Name: Sandra Garcia      : 1980      MRN: [id number]  Encounter Provider: Jack Walker MD  Encounter Date: 9/10/2024   Encounter department: [facility]    Assessment & Plan  Dyspepsia  Suspect underlying acid reflux based on bloating gas.  Constant clearing of the throat worse when laying down.  Trial patient on Protonix 20 mg daily.  If no improvement will refer to gastroenterology for EGD.  Repeat labs    Orders:    pantoprazole (PROTONIX) 20 mg tablet; Take 1 tablet (20 mg total) by mouth daily before breakfast    CBC and Platelet; Future    Type 1 diabetes mellitus without complication (HCC)    Lab Results   Component Value Date    HGBA1C 10.5 (H) 2024   Continues to struggle blood sugars.  Has upcoming appointment with endocrinologist at [facility].  Could have a component of gastroparesis.  Trial on the Protonix consider referral to gastroenterology.  Repeat A1c    Orders:    Hemoglobin A1C; Future       History of Present Illness     Bloated gassy for the past 2 months.  Denies any blood in stool.  Occasional loose stools.  Denies any recent travel.  Does endorse indigestion.  Was told previously she has silent acid reflux.  Denies any epigastric pain.  Denies any pain radiating to the back.  She did  a probiotic 1 week ago.        GI Problem    The illness does not include constipation.         Review of Systems   Gastrointestinal:  Negative for blood in stool and constipation.        Abdominal bloating and gassiness.  Acid reflux  Denies blood in stool.  Denies black stools     Medical History Reviewed by provider this encounter:  Tobacco  Allergies  Meds  Problems  Med Hx  Surg Hx  Fam Hx       Current Outpatient Medications on File Prior to Visit   Medication Sig Dispense Refill    fluticasone (FLONASE) 50 mcg/act nasal spray 1 spray into each nostril daily (Patient taking differently: 1 spray into each nostril if needed) 1  "Bottle 0    glucagon (GLUCAGON EMERGENCY) 1 MG injection 1 mg as needed for low blood sugar May repeat dose  every 20 minutes as needed      Glycerin, PF, (Oasis Tears PF) 0.22 % SOLN if needed      insulin aspart (NovoLOG) 100 units/mL injection Inject under the skin Sliding scale      NovoLOG FlexPen 100 units/mL injection pen TAKE 40 UNITS IN DIVIDED DOSES BY USING ICR8 AND ISF 30. E10.9      Tresiba FlexTouch 200 units/mL CONCENTRATED U-200 injection pen       valACYclovir (VALTREX) 1,000 mg tablet       ACCU-CHEK FASTCLIX LANCETS MISC ACCU-CHEK FASTCLIX 6time daily EE10.65 (Patient not taking: Reported on 2024)      BD PEN NEEDLE LEANDRA U/F 32G X 4 MM MISC  (Patient not taking: Reported on 2024)      erythromycin (ILOTYCIN) ophthalmic ointment APPLY 1 APPLICATION IN BOTH EYES AT BEDTIME (Patient not taking: Reported on 2024)      Flarex 0.1 % ophthalmic suspension  (Patient not taking: Reported on 2024)      glucose blood (ACCU-CHEK MUKESH PLUS) test strip 1 each by Other route as needed (hypoglycemia) Use as instructed (Patient not taking: Reported on 2024) 100 each 0     No current facility-administered medications on file prior to visit.      Social History     Tobacco Use    Smoking status: Former     Current packs/day: 0.00     Types: Cigarettes     Quit date: 2012     Years since quittin.7    Smokeless tobacco: Never   Vaping Use    Vaping status: Never Used   Substance and Sexual Activity    Alcohol use: Yes     Comment: social    Drug use: No    Sexual activity: Yes     Partners: Male     Birth control/protection: Female Sterilization         Objective     /86 (BP Location: Left arm, Patient Position: Sitting, Cuff Size: Large)   Pulse 100   Temp 98.6 °F (37 °C) (Temporal)   Resp 16   Ht 5' 4\" (1.626 m)   Wt 69.4 kg (153 lb)   SpO2 99%   BMI 26.26 kg/m²     Physical Exam  Vitals and nursing note reviewed.   Constitutional:       Appearance: Normal appearance. She " is well-developed.   HENT:      Head: Normocephalic and atraumatic.   Cardiovascular:      Rate and Rhythm: Normal rate and regular rhythm.   Pulmonary:      Effort: Pulmonary effort is normal.      Breath sounds: Normal breath sounds.   Abdominal:      General: Bowel sounds are normal.      Palpations: Abdomen is soft.   Musculoskeletal:      Cervical back: Normal range of motion.   Skin:     General: Skin is warm.   Neurological:      General: No focal deficit present.      Mental Status: She is alert.   Psychiatric:         Mood and Affect: Mood normal.         Speech: Speech normal.

## 2024-09-10 NOTE — ASSESSMENT & PLAN NOTE
Lab Results   Component Value Date    HGBA1C 10.5 (H) 03/20/2024   Continues to struggle blood sugars.  Has upcoming appointment with endocrinologist at [facility].  Could have a component of gastroparesis.  Trial on the Protonix consider referral to gastroenterology.  Repeat A1c    Orders:    Hemoglobin A1C; Future

## 2024-09-10 NOTE — ASSESSMENT & PLAN NOTE
Suspect underlying acid reflux based on bloating gas.  Constant clearing of the throat worse when laying down.  Trial patient on Protonix 20 mg daily.  If no improvement will refer to gastroenterology for EGD.  Repeat labs    Orders:    pantoprazole (PROTONIX) 20 mg tablet; Take 1 tablet (20 mg total) by mouth daily before breakfast    CBC and Platelet; Future

## 2024-09-11 ENCOUNTER — APPOINTMENT (OUTPATIENT)
Dept: LAB | Facility: CLINIC | Age: 44
End: 2024-09-11
Payer: COMMERCIAL

## 2024-09-11 DIAGNOSIS — E78.5 DYSLIPIDEMIA: ICD-10-CM

## 2024-09-11 DIAGNOSIS — M35.01 KERATOCONJUNCTIVITIS SICCA: ICD-10-CM

## 2024-09-11 DIAGNOSIS — R10.13 DYSPEPSIA: ICD-10-CM

## 2024-09-11 DIAGNOSIS — E10.9 TYPE 1 DIABETES MELLITUS WITHOUT COMPLICATION (HCC): ICD-10-CM

## 2024-09-11 LAB
CRP SERPL QL: 2.2 MG/L
ERYTHROCYTE [DISTWIDTH] IN BLOOD BY AUTOMATED COUNT: 14.1 % (ref 11.6–15.1)
ERYTHROCYTE [SEDIMENTATION RATE] IN BLOOD: 22 MM/HOUR (ref 0–19)
EST. AVERAGE GLUCOSE BLD GHB EST-MCNC: 275 MG/DL
HBA1C MFR BLD: 11.2 %
HCT VFR BLD AUTO: 36.8 % (ref 34.8–46.1)
HGB BLD-MCNC: 11.5 G/DL (ref 11.5–15.4)
MCH RBC QN AUTO: 26.2 PG (ref 26.8–34.3)
MCHC RBC AUTO-ENTMCNC: 31.3 G/DL (ref 31.4–37.4)
MCV RBC AUTO: 84 FL (ref 82–98)
PLATELET # BLD AUTO: 233 THOUSANDS/UL (ref 149–390)
PMV BLD AUTO: 14 FL (ref 8.9–12.7)
RBC # BLD AUTO: 4.39 MILLION/UL (ref 3.81–5.12)
WBC # BLD AUTO: 5.88 THOUSAND/UL (ref 4.31–10.16)

## 2024-09-11 PROCEDURE — 85652 RBC SED RATE AUTOMATED: CPT

## 2024-09-11 PROCEDURE — 85027 COMPLETE CBC AUTOMATED: CPT

## 2024-09-11 PROCEDURE — 83036 HEMOGLOBIN GLYCOSYLATED A1C: CPT

## 2024-09-11 PROCEDURE — 86140 C-REACTIVE PROTEIN: CPT

## 2024-09-11 PROCEDURE — 36415 COLL VENOUS BLD VENIPUNCTURE: CPT

## 2024-09-12 ENCOUNTER — TELEPHONE (OUTPATIENT)
Age: 44
End: 2024-09-12

## 2024-09-12 NOTE — TELEPHONE ENCOUNTER
Patient called in post OV 9/10; had labs completed yesterday 9/11 for PCP and LV provider. Patient is requesting PCP review all results and follow up with her. Patient is aware PCP out of office until Monday 9/16.Please follow up with patient for results.

## 2024-09-17 NOTE — TELEPHONE ENCOUNTER
Patient called because she still has not received a call to discuss her results. Please call back. Thank you.

## 2024-10-03 DIAGNOSIS — R10.13 DYSPEPSIA: ICD-10-CM

## 2024-10-03 RX ORDER — PANTOPRAZOLE SODIUM 20 MG/1
20 TABLET, DELAYED RELEASE ORAL
Qty: 90 TABLET | Refills: 1 | Status: SHIPPED | OUTPATIENT
Start: 2024-10-03

## 2024-10-23 ENCOUNTER — APPOINTMENT (OUTPATIENT)
Dept: LAB | Facility: CLINIC | Age: 44
End: 2024-10-23
Payer: COMMERCIAL

## 2024-10-23 DIAGNOSIS — R10.9 ABDOMINAL PAIN, UNSPECIFIED ABDOMINAL LOCATION: ICD-10-CM

## 2024-10-23 DIAGNOSIS — R70.0 ELEVATED SEDIMENTATION RATE: ICD-10-CM

## 2024-10-23 DIAGNOSIS — E04.9 GOITER: ICD-10-CM

## 2024-10-23 DIAGNOSIS — E55.9 VITAMIN D2 DEFICIENCY: ICD-10-CM

## 2024-10-23 DIAGNOSIS — D64.9 ANEMIA, UNSPECIFIED TYPE: ICD-10-CM

## 2024-10-23 DIAGNOSIS — M35.01 SICCA SYNDROME WITH KERATOCONJUNCTIVITIS (HCC): ICD-10-CM

## 2024-10-23 LAB
25(OH)D3 SERPL-MCNC: 28.1 NG/ML (ref 30–100)
ANA SER QL IA: NEGATIVE
ERYTHROCYTE [SEDIMENTATION RATE] IN BLOOD: 8 MM/HOUR (ref 0–19)
FERRITIN SERPL-MCNC: 10 NG/ML (ref 11–307)
GLIADIN PEPTIDE IGA SER-ACNC: 6.9 U/ML
GLIADIN PEPTIDE IGA SER-ACNC: NEGATIVE
GLIADIN PEPTIDE IGG SER-ACNC: <0.4 U/ML
GLIADIN PEPTIDE IGG SER-ACNC: NEGATIVE
IGA SERPL-MCNC: 173 MG/DL (ref 66–433)
IRON SATN MFR SERPL: 54 % (ref 15–50)
IRON SERPL-MCNC: 209 UG/DL (ref 50–212)
LDH SERPL-CCNC: 124 U/L (ref 140–271)
TIBC SERPL-MCNC: 389 UG/DL (ref 250–450)
TSH SERPL DL<=0.05 MIU/L-ACNC: 1.75 UIU/ML (ref 0.45–4.5)
TTG IGA SER-ACNC: <0.5 U/ML
TTG IGA SER-ACNC: NEGATIVE
TTG IGG SER-ACNC: <0.8 U/ML
TTG IGG SER-ACNC: NEGATIVE
UIBC SERPL-MCNC: 180 UG/DL (ref 155–355)

## 2024-10-23 PROCEDURE — 82728 ASSAY OF FERRITIN: CPT

## 2024-10-23 PROCEDURE — 83550 IRON BINDING TEST: CPT

## 2024-10-23 PROCEDURE — 86430 RHEUMATOID FACTOR TEST QUAL: CPT

## 2024-10-23 PROCEDURE — 84165 PROTEIN E-PHORESIS SERUM: CPT

## 2024-10-23 PROCEDURE — 82306 VITAMIN D 25 HYDROXY: CPT

## 2024-10-23 PROCEDURE — 84443 ASSAY THYROID STIM HORMONE: CPT

## 2024-10-23 PROCEDURE — 36415 COLL VENOUS BLD VENIPUNCTURE: CPT

## 2024-10-23 PROCEDURE — 83540 ASSAY OF IRON: CPT

## 2024-10-23 PROCEDURE — 86200 CCP ANTIBODY: CPT

## 2024-10-23 PROCEDURE — 86258 DGP ANTIBODY EACH IG CLASS: CPT

## 2024-10-23 PROCEDURE — 85652 RBC SED RATE AUTOMATED: CPT

## 2024-10-23 PROCEDURE — 82784 ASSAY IGA/IGD/IGG/IGM EACH: CPT

## 2024-10-23 PROCEDURE — 86038 ANTINUCLEAR ANTIBODIES: CPT

## 2024-10-23 PROCEDURE — 83615 LACTATE (LD) (LDH) ENZYME: CPT

## 2024-10-23 PROCEDURE — 86364 TISS TRNSGLTMNASE EA IG CLAS: CPT

## 2024-10-24 LAB
ALBUMIN SERPL ELPH-MCNC: 4.4 G/DL (ref 3.2–5.1)
ALBUMIN SERPL ELPH-MCNC: 62.9 % (ref 48–70)
ALPHA1 GLOB SERPL ELPH-MCNC: 0.25 G/DL (ref 0.15–0.47)
ALPHA1 GLOB SERPL ELPH-MCNC: 3.6 % (ref 1.8–7)
ALPHA2 GLOB SERPL ELPH-MCNC: 0.65 G/DL (ref 0.42–1.04)
ALPHA2 GLOB SERPL ELPH-MCNC: 9.3 % (ref 5.9–14.9)
BETA GLOB ABNORMAL SERPL ELPH-MCNC: 0.48 G/DL (ref 0.31–0.57)
BETA1 GLOB SERPL ELPH-MCNC: 6.8 % (ref 4.7–7.7)
BETA2 GLOB SERPL ELPH-MCNC: 5.5 % (ref 3.1–7.9)
BETA2+GAMMA GLOB SERPL ELPH-MCNC: 0.39 G/DL (ref 0.2–0.58)
GAMMA GLOB ABNORMAL SERPL ELPH-MCNC: 0.83 G/DL (ref 0.4–1.66)
GAMMA GLOB SERPL ELPH-MCNC: 11.9 % (ref 6.9–22.3)
IGG/ALB SER: 1.7 {RATIO} (ref 1.1–1.8)
PROT PATTERN SERPL ELPH-IMP: NORMAL
PROT SERPL-MCNC: 7 G/DL (ref 6.4–8.2)
RHEUMATOID FACT SER QL LA: NEGATIVE

## 2024-10-24 PROCEDURE — 84165 PROTEIN E-PHORESIS SERUM: CPT | Performed by: PATHOLOGY

## 2024-10-25 LAB — CCP AB SER IA-ACNC: 0.7

## 2024-11-06 ENCOUNTER — HOSPITAL ENCOUNTER (OUTPATIENT)
Dept: RADIOLOGY | Age: 44
Discharge: HOME | End: 2024-11-06
Payer: COMMERCIAL

## 2024-11-06 DIAGNOSIS — E04.9 NONTOXIC GOITER, UNSPECIFIED: ICD-10-CM

## 2024-11-06 DIAGNOSIS — R10.9 UNSPECIFIED ABDOMINAL PAIN: ICD-10-CM

## 2024-11-06 PROCEDURE — 76700 US EXAM ABDOM COMPLETE: CPT

## 2024-11-06 PROCEDURE — 76536 US EXAM OF HEAD AND NECK: CPT

## 2024-12-11 ENCOUNTER — APPOINTMENT (OUTPATIENT)
Dept: LAB | Facility: CLINIC | Age: 44
End: 2024-12-11
Payer: COMMERCIAL

## 2024-12-11 ENCOUNTER — TRANSCRIBE ORDERS (OUTPATIENT)
Dept: LAB | Facility: CLINIC | Age: 44
End: 2024-12-11

## 2024-12-11 DIAGNOSIS — D64.9 ANEMIA, UNSPECIFIED TYPE: Primary | ICD-10-CM

## 2024-12-11 DIAGNOSIS — D64.9 ANEMIA, UNSPECIFIED TYPE: ICD-10-CM

## 2024-12-11 LAB
FERRITIN SERPL-MCNC: 19 NG/ML (ref 11–307)
IRON SATN MFR SERPL: 36 % (ref 15–50)
IRON SERPL-MCNC: 128 UG/DL (ref 50–212)
TIBC SERPL-MCNC: 353 UG/DL (ref 250–450)
UIBC SERPL-MCNC: 225 UG/DL (ref 155–355)

## 2024-12-11 PROCEDURE — 82728 ASSAY OF FERRITIN: CPT

## 2024-12-11 PROCEDURE — 83550 IRON BINDING TEST: CPT

## 2024-12-11 PROCEDURE — 83540 ASSAY OF IRON: CPT

## 2024-12-18 ENCOUNTER — HOSPITAL ENCOUNTER (OUTPATIENT)
Dept: RADIOLOGY | Facility: HOSPITAL | Age: 44
Discharge: HOME | End: 2024-12-18
Payer: COMMERCIAL

## 2024-12-18 VITALS — BODY MASS INDEX: 25.61 KG/M2 | WEIGHT: 150 LBS | HEIGHT: 64 IN

## 2024-12-18 DIAGNOSIS — Z12.31 ENCOUNTER FOR SCREENING MAMMOGRAM FOR BREAST CANCER: ICD-10-CM

## 2024-12-18 PROCEDURE — 77067 SCR MAMMO BI INCL CAD: CPT

## 2024-12-18 PROCEDURE — 77063 BREAST TOMOSYNTHESIS BI: CPT

## 2024-12-23 ENCOUNTER — RESULTS FOLLOW-UP (OUTPATIENT)
Dept: FAMILY MEDICINE CLINIC | Facility: CLINIC | Age: 44
End: 2024-12-23

## 2025-01-20 LAB
LEFT EYE DIABETIC RETINOPATHY: NORMAL
RIGHT EYE DIABETIC RETINOPATHY: NORMAL

## 2025-02-05 ENCOUNTER — ANNUAL EXAM (OUTPATIENT)
Dept: OBGYN CLINIC | Facility: CLINIC | Age: 45
End: 2025-02-05
Payer: COMMERCIAL

## 2025-02-05 VITALS
WEIGHT: 151.2 LBS | BODY MASS INDEX: 25.81 KG/M2 | HEIGHT: 64 IN | SYSTOLIC BLOOD PRESSURE: 130 MMHG | DIASTOLIC BLOOD PRESSURE: 82 MMHG

## 2025-02-05 DIAGNOSIS — Z12.4 CERVICAL CANCER SCREENING: ICD-10-CM

## 2025-02-05 DIAGNOSIS — Z01.419 WELL WOMAN EXAM WITH ROUTINE GYNECOLOGICAL EXAM: Primary | ICD-10-CM

## 2025-02-05 DIAGNOSIS — Z12.39 ENCOUNTER FOR SCREENING BREAST EXAMINATION: ICD-10-CM

## 2025-02-05 DIAGNOSIS — Z12.31 ENCOUNTER FOR SCREENING MAMMOGRAM FOR MALIGNANT NEOPLASM OF BREAST: ICD-10-CM

## 2025-02-05 DIAGNOSIS — Z01.419 ENCOUNTER FOR WELL WOMAN EXAM: ICD-10-CM

## 2025-02-05 DIAGNOSIS — Z11.51 SCREENING FOR HPV (HUMAN PAPILLOMAVIRUS): ICD-10-CM

## 2025-02-05 DIAGNOSIS — Z12.4 ENCOUNTER FOR SCREENING FOR MALIGNANT NEOPLASM OF CERVIX: ICD-10-CM

## 2025-02-05 PROCEDURE — S0612 ANNUAL GYNECOLOGICAL EXAMINA: HCPCS | Performed by: PHYSICIAN ASSISTANT

## 2025-02-05 PROCEDURE — G0145 SCR C/V CYTO,THINLAYER,RESCR: HCPCS | Performed by: PHYSICIAN ASSISTANT

## 2025-02-05 PROCEDURE — G0476 HPV COMBO ASSAY CA SCREEN: HCPCS | Performed by: PHYSICIAN ASSISTANT

## 2025-02-05 NOTE — PROGRESS NOTES
Assessment/Plan:      Diagnoses and all orders for this visit:    Well woman exam with routine gynecological exam  -     Mammo screening bilateral w 3d and cad; Future  -     Liquid-based pap, screening    Encounter for screening for malignant neoplasm of cervix  -     Liquid-based pap, screening    Screening for HPV (human papillomavirus)  -     Liquid-based pap, screening    Encounter for screening mammogram for malignant neoplasm of breast  -     Mammo screening bilateral w 3d and cad; Future    Encounter for well woman exam    Encounter for screening breast examination    Cervical cancer screening          Subjective:     Patient ID: Sandra Garcia is a 44 y.o. female.    Pt presents for her annual exam today--  She has no gyn complaints  She has regular bleeding  no pelvic pain  Bowel and bladder are regular  Colonoscopy--23  No breast concerns today  Last mammo--24  H/o TL  H/o DM    pap today.    Rx mammo  Daily mvi  Nsaids prn        Review of Systems   Constitutional:  Negative for chills, fever and unexpected weight change.   HENT:  Negative for ear pain and sore throat.    Eyes:  Negative for pain and visual disturbance.   Respiratory:  Negative for cough and shortness of breath.    Cardiovascular:  Negative for chest pain and palpitations.   Gastrointestinal:  Negative for abdominal pain, blood in stool, constipation, diarrhea and vomiting.   Genitourinary: Negative.  Negative for dysuria and hematuria.   Musculoskeletal:  Negative for arthralgias and back pain.   Skin:  Negative for color change and rash.   Neurological:  Negative for seizures and syncope.   All other systems reviewed and are negative.        Objective:     Physical Exam  Vitals and nursing note reviewed.   Constitutional:       Appearance: Normal appearance. She is well-developed.   HENT:      Head: Normocephalic and atraumatic.   Chest:   Breasts:     Right: No inverted nipple, mass, nipple discharge or skin change.      Left: No  inverted nipple, mass, nipple discharge or skin change.   Abdominal:      Palpations: Abdomen is soft.   Genitourinary:     General: Normal vulva.      Exam position: Supine.      Labia:         Right: No rash, tenderness or lesion.         Left: No rash, tenderness or lesion.       Vagina: Normal.      Cervix: No cervical motion tenderness, discharge or friability.      Uterus: Normal.       Adnexa: Right adnexa normal and left adnexa normal.        Right: No mass, tenderness or fullness.          Left: No mass, tenderness or fullness.     Musculoskeletal:      Cervical back: Normal range of motion.   Lymphadenopathy:      Lower Body: No right inguinal adenopathy. No left inguinal adenopathy.   Neurological:      Mental Status: She is alert.

## 2025-02-10 ENCOUNTER — RESULTS FOLLOW-UP (OUTPATIENT)
Dept: OBGYN CLINIC | Facility: CLINIC | Age: 45
End: 2025-02-10

## 2025-02-10 LAB
LAB AP GYN PRIMARY INTERPRETATION: NORMAL
Lab: NORMAL

## 2025-05-07 ENCOUNTER — TRANSCRIBE ORDERS (OUTPATIENT)
Dept: LAB | Facility: CLINIC | Age: 45
End: 2025-05-07

## 2025-05-07 ENCOUNTER — APPOINTMENT (OUTPATIENT)
Dept: LAB | Facility: CLINIC | Age: 45
End: 2025-05-07
Payer: COMMERCIAL

## 2025-05-07 DIAGNOSIS — R53.83 FATIGUE, UNSPECIFIED TYPE: ICD-10-CM

## 2025-05-07 DIAGNOSIS — E10.9 TYPE 1 DIABETES MELLITUS WITHOUT COMPLICATION (HCC): ICD-10-CM

## 2025-05-07 DIAGNOSIS — E55.9 AVITAMINOSIS D: Primary | ICD-10-CM

## 2025-05-07 DIAGNOSIS — D50.9 IRON DEFICIENCY ANEMIA, UNSPECIFIED IRON DEFICIENCY ANEMIA TYPE: ICD-10-CM

## 2025-05-07 DIAGNOSIS — E10.69 TYPE 1 DIABETES MELLITUS WITH OTHER SPECIFIED COMPLICATION (HCC): Primary | ICD-10-CM

## 2025-05-07 DIAGNOSIS — E55.9 AVITAMINOSIS D: ICD-10-CM

## 2025-05-07 DIAGNOSIS — E10.69 TYPE 1 DIABETES MELLITUS WITH OTHER SPECIFIED COMPLICATION (HCC): ICD-10-CM

## 2025-05-07 LAB
25(OH)D3 SERPL-MCNC: 28.4 NG/ML (ref 30–100)
ALBUMIN SERPL BCG-MCNC: 4.2 G/DL (ref 3.5–5)
ALP SERPL-CCNC: 44 U/L (ref 34–104)
ALT SERPL W P-5'-P-CCNC: 10 U/L (ref 7–52)
ANION GAP SERPL CALCULATED.3IONS-SCNC: 9 MMOL/L (ref 4–13)
AST SERPL W P-5'-P-CCNC: 15 U/L (ref 13–39)
BASOPHILS # BLD AUTO: 0.07 THOUSANDS/ÂΜL (ref 0–0.1)
BASOPHILS NFR BLD AUTO: 1 % (ref 0–1)
BILIRUB SERPL-MCNC: 0.38 MG/DL (ref 0.2–1)
BUN SERPL-MCNC: 10 MG/DL (ref 5–25)
CALCIUM SERPL-MCNC: 9.2 MG/DL (ref 8.4–10.2)
CHLORIDE SERPL-SCNC: 102 MMOL/L (ref 96–108)
CO2 SERPL-SCNC: 26 MMOL/L (ref 21–32)
CREAT SERPL-MCNC: 0.48 MG/DL (ref 0.6–1.3)
CREAT UR-MCNC: 55.6 MG/DL
EOSINOPHIL # BLD AUTO: 0.18 THOUSAND/ÂΜL (ref 0–0.61)
EOSINOPHIL NFR BLD AUTO: 4 % (ref 0–6)
ERYTHROCYTE [DISTWIDTH] IN BLOOD BY AUTOMATED COUNT: 12 % (ref 11.6–15.1)
FERRITIN SERPL-MCNC: 19 NG/ML (ref 30–307)
GFR SERPL CREATININE-BSD FRML MDRD: 121 ML/MIN/1.73SQ M
GLUCOSE SERPL-MCNC: 174 MG/DL (ref 65–140)
HCT VFR BLD AUTO: 40.8 % (ref 34.8–46.1)
HGB BLD-MCNC: 13.5 G/DL (ref 11.5–15.4)
IMM GRANULOCYTES # BLD AUTO: 0.01 THOUSAND/UL (ref 0–0.2)
IMM GRANULOCYTES NFR BLD AUTO: 0 % (ref 0–2)
IRON SATN MFR SERPL: 32 % (ref 15–50)
IRON SERPL-MCNC: 115 UG/DL (ref 50–212)
LYMPHOCYTES # BLD AUTO: 1.81 THOUSANDS/ÂΜL (ref 0.6–4.47)
LYMPHOCYTES NFR BLD AUTO: 36 % (ref 14–44)
MCH RBC QN AUTO: 31.5 PG (ref 26.8–34.3)
MCHC RBC AUTO-ENTMCNC: 33.1 G/DL (ref 31.4–37.4)
MCV RBC AUTO: 95 FL (ref 82–98)
MICROALBUMIN UR-MCNC: 7.3 MG/L
MICROALBUMIN/CREAT 24H UR: 13 MG/G CREATININE (ref 0–30)
MONOCYTES # BLD AUTO: 0.33 THOUSAND/ÂΜL (ref 0.17–1.22)
MONOCYTES NFR BLD AUTO: 7 % (ref 4–12)
NEUTROPHILS # BLD AUTO: 2.69 THOUSANDS/ÂΜL (ref 1.85–7.62)
NEUTS SEG NFR BLD AUTO: 52 % (ref 43–75)
NRBC BLD AUTO-RTO: 0 /100 WBCS
PLATELET # BLD AUTO: 223 THOUSANDS/UL (ref 149–390)
PMV BLD AUTO: 14.5 FL (ref 8.9–12.7)
POTASSIUM SERPL-SCNC: 4.2 MMOL/L (ref 3.5–5.3)
PROT SERPL-MCNC: 6.7 G/DL (ref 6.4–8.4)
RBC # BLD AUTO: 4.28 MILLION/UL (ref 3.81–5.12)
SODIUM SERPL-SCNC: 137 MMOL/L (ref 135–147)
TIBC SERPL-MCNC: 364 UG/DL (ref 250–450)
TRANSFERRIN SERPL-MCNC: 260 MG/DL (ref 203–362)
UIBC SERPL-MCNC: 249 UG/DL (ref 155–355)
VIT B12 SERPL-MCNC: 355 PG/ML (ref 180–914)
WBC # BLD AUTO: 5.09 THOUSAND/UL (ref 4.31–10.16)

## 2025-05-07 PROCEDURE — 83540 ASSAY OF IRON: CPT

## 2025-05-07 PROCEDURE — 82570 ASSAY OF URINE CREATININE: CPT

## 2025-05-07 PROCEDURE — 82306 VITAMIN D 25 HYDROXY: CPT

## 2025-05-07 PROCEDURE — 82043 UR ALBUMIN QUANTITATIVE: CPT

## 2025-05-07 PROCEDURE — 82728 ASSAY OF FERRITIN: CPT

## 2025-05-07 PROCEDURE — 80053 COMPREHEN METABOLIC PANEL: CPT

## 2025-05-07 PROCEDURE — 83036 HEMOGLOBIN GLYCOSYLATED A1C: CPT

## 2025-05-07 PROCEDURE — 85025 COMPLETE CBC W/AUTO DIFF WBC: CPT

## 2025-05-07 PROCEDURE — 82607 VITAMIN B-12: CPT

## 2025-05-07 PROCEDURE — 83550 IRON BINDING TEST: CPT

## 2025-05-08 LAB
EST. AVERAGE GLUCOSE BLD GHB EST-MCNC: 255 MG/DL
HBA1C MFR BLD: 10.5 %

## 2025-07-31 ENCOUNTER — APPOINTMENT (OUTPATIENT)
Dept: LAB | Facility: CLINIC | Age: 45
End: 2025-07-31
Payer: COMMERCIAL

## 2025-07-31 ENCOUNTER — TRANSCRIBE ORDERS (OUTPATIENT)
Dept: LAB | Facility: CLINIC | Age: 45
End: 2025-07-31

## 2025-07-31 DIAGNOSIS — I10 ESSENTIAL HYPERTENSION, MALIGNANT: Primary | ICD-10-CM

## 2025-07-31 DIAGNOSIS — I10 ESSENTIAL HYPERTENSION, MALIGNANT: ICD-10-CM

## 2025-07-31 LAB
ANION GAP SERPL CALCULATED.3IONS-SCNC: 11 MMOL/L (ref 4–13)
BUN SERPL-MCNC: 12 MG/DL (ref 5–25)
CALCIUM SERPL-MCNC: 9 MG/DL (ref 8.4–10.2)
CHLORIDE SERPL-SCNC: 99 MMOL/L (ref 96–108)
CO2 SERPL-SCNC: 24 MMOL/L (ref 21–32)
CREAT SERPL-MCNC: 0.44 MG/DL (ref 0.6–1.3)
GFR SERPL CREATININE-BSD FRML MDRD: 124 ML/MIN/1.73SQ M
GLUCOSE P FAST SERPL-MCNC: 256 MG/DL (ref 65–99)
POTASSIUM SERPL-SCNC: 4.2 MMOL/L (ref 3.5–5.3)
SODIUM SERPL-SCNC: 134 MMOL/L (ref 135–147)

## 2025-07-31 PROCEDURE — 80048 BASIC METABOLIC PNL TOTAL CA: CPT

## 2025-07-31 PROCEDURE — 36415 COLL VENOUS BLD VENIPUNCTURE: CPT
